# Patient Record
Sex: FEMALE | Race: WHITE | Employment: PART TIME | URBAN - METROPOLITAN AREA
[De-identification: names, ages, dates, MRNs, and addresses within clinical notes are randomized per-mention and may not be internally consistent; named-entity substitution may affect disease eponyms.]

---

## 2019-06-08 ENCOUNTER — ANESTHESIA (OUTPATIENT)
Dept: OPERATING ROOM | Age: 39
DRG: 343 | End: 2019-06-08
Payer: COMMERCIAL

## 2019-06-08 ENCOUNTER — ANESTHESIA EVENT (OUTPATIENT)
Dept: OPERATING ROOM | Age: 39
DRG: 343 | End: 2019-06-08
Payer: COMMERCIAL

## 2019-06-08 ENCOUNTER — APPOINTMENT (OUTPATIENT)
Dept: CT IMAGING | Age: 39
DRG: 343 | End: 2019-06-08
Payer: COMMERCIAL

## 2019-06-08 ENCOUNTER — HOSPITAL ENCOUNTER (INPATIENT)
Age: 39
LOS: 1 days | Discharge: HOME | DRG: 343 | End: 2019-06-09
Attending: EMERGENCY MEDICINE | Admitting: SURGERY
Payer: COMMERCIAL

## 2019-06-08 VITALS — DIASTOLIC BLOOD PRESSURE: 77 MMHG | SYSTOLIC BLOOD PRESSURE: 125 MMHG | OXYGEN SATURATION: 99 % | TEMPERATURE: 97.3 F

## 2019-06-08 DIAGNOSIS — K35.80 ACUTE APPENDICITIS, UNSPECIFIED ACUTE APPENDICITIS TYPE: Primary | ICD-10-CM

## 2019-06-08 DIAGNOSIS — G89.18 POST-OPERATIVE PAIN: ICD-10-CM

## 2019-06-08 LAB
ALBUMIN SERPL-MCNC: 4.3 G/DL (ref 3.5–5.2)
ALP BLD-CCNC: 103 U/L (ref 35–104)
ALT SERPL-CCNC: 41 U/L (ref 0–32)
ANION GAP SERPL CALCULATED.3IONS-SCNC: 11 MMOL/L (ref 7–16)
APTT: 31.8 SEC (ref 24.5–35.1)
AST SERPL-CCNC: 28 U/L (ref 0–31)
BASOPHILS ABSOLUTE: 0.04 E9/L (ref 0–0.2)
BASOPHILS RELATIVE PERCENT: 0.6 % (ref 0–2)
BILIRUB SERPL-MCNC: 0.4 MG/DL (ref 0–1.2)
BUN BLDV-MCNC: 11 MG/DL (ref 6–20)
CALCIUM SERPL-MCNC: 9.6 MG/DL (ref 8.6–10.2)
CHLORIDE BLD-SCNC: 105 MMOL/L (ref 98–107)
CO2: 26 MMOL/L (ref 22–29)
CREAT SERPL-MCNC: 0.8 MG/DL (ref 0.5–1)
EOSINOPHILS ABSOLUTE: 0.17 E9/L (ref 0.05–0.5)
EOSINOPHILS RELATIVE PERCENT: 2.4 % (ref 0–6)
GFR AFRICAN AMERICAN: >60
GFR NON-AFRICAN AMERICAN: >60 ML/MIN/1.73
GLUCOSE BLD-MCNC: 107 MG/DL (ref 74–99)
HCG(URINE) PREGNANCY TEST: NEGATIVE
HCT VFR BLD CALC: 41.8 % (ref 34–48)
HEMOGLOBIN: 13.8 G/DL (ref 11.5–15.5)
IMMATURE GRANULOCYTES #: 0.02 E9/L
IMMATURE GRANULOCYTES %: 0.3 % (ref 0–5)
INR BLD: 1.1
LACTIC ACID: 1.2 MMOL/L (ref 0.5–2.2)
LIPASE: 21 U/L (ref 13–60)
LYMPHOCYTES ABSOLUTE: 1.87 E9/L (ref 1.5–4)
LYMPHOCYTES RELATIVE PERCENT: 26.8 % (ref 20–42)
MCH RBC QN AUTO: 29.1 PG (ref 26–35)
MCHC RBC AUTO-ENTMCNC: 33 % (ref 32–34.5)
MCV RBC AUTO: 88.2 FL (ref 80–99.9)
MONOCYTES ABSOLUTE: 0.72 E9/L (ref 0.1–0.95)
MONOCYTES RELATIVE PERCENT: 10.3 % (ref 2–12)
NEUTROPHILS ABSOLUTE: 4.17 E9/L (ref 1.8–7.3)
NEUTROPHILS RELATIVE PERCENT: 59.6 % (ref 43–80)
PDW BLD-RTO: 12.4 FL (ref 11.5–15)
PLATELET # BLD: 214 E9/L (ref 130–450)
PMV BLD AUTO: 10.9 FL (ref 7–12)
POTASSIUM SERPL-SCNC: 3.8 MMOL/L (ref 3.5–5)
PROTHROMBIN TIME: 12.2 SEC (ref 9.3–12.4)
RBC # BLD: 4.74 E12/L (ref 3.5–5.5)
SODIUM BLD-SCNC: 142 MMOL/L (ref 132–146)
TOTAL PROTEIN: 7 G/DL (ref 6.4–8.3)
WBC # BLD: 7 E9/L (ref 4.5–11.5)

## 2019-06-08 PROCEDURE — 6360000002 HC RX W HCPCS: Performed by: ANESTHESIOLOGY

## 2019-06-08 PROCEDURE — 6360000004 HC RX CONTRAST MEDICATION: Performed by: RADIOLOGY

## 2019-06-08 PROCEDURE — 88304 TISSUE EXAM BY PATHOLOGIST: CPT

## 2019-06-08 PROCEDURE — 3700000001 HC ADD 15 MINUTES (ANESTHESIA): Performed by: SURGERY

## 2019-06-08 PROCEDURE — 7100000001 HC PACU RECOVERY - ADDTL 15 MIN: Performed by: SURGERY

## 2019-06-08 PROCEDURE — 7100000000 HC PACU RECOVERY - FIRST 15 MIN: Performed by: SURGERY

## 2019-06-08 PROCEDURE — 2580000003 HC RX 258: Performed by: NURSE ANESTHETIST, CERTIFIED REGISTERED

## 2019-06-08 PROCEDURE — G0378 HOSPITAL OBSERVATION PER HR: HCPCS

## 2019-06-08 PROCEDURE — 3600000014 HC SURGERY LEVEL 4 ADDTL 15MIN: Performed by: SURGERY

## 2019-06-08 PROCEDURE — 74177 CT ABD & PELVIS W/CONTRAST: CPT

## 2019-06-08 PROCEDURE — 6370000000 HC RX 637 (ALT 250 FOR IP)

## 2019-06-08 PROCEDURE — 2720000010 HC SURG SUPPLY STERILE: Performed by: SURGERY

## 2019-06-08 PROCEDURE — 3600000004 HC SURGERY LEVEL 4 BASE: Performed by: SURGERY

## 2019-06-08 PROCEDURE — 83605 ASSAY OF LACTIC ACID: CPT

## 2019-06-08 PROCEDURE — 6360000002 HC RX W HCPCS: Performed by: NURSE ANESTHETIST, CERTIFIED REGISTERED

## 2019-06-08 PROCEDURE — 80053 COMPREHEN METABOLIC PANEL: CPT

## 2019-06-08 PROCEDURE — 1200000000 HC SEMI PRIVATE

## 2019-06-08 PROCEDURE — 81025 URINE PREGNANCY TEST: CPT

## 2019-06-08 PROCEDURE — 6360000002 HC RX W HCPCS: Performed by: SURGERY

## 2019-06-08 PROCEDURE — 99285 EMERGENCY DEPT VISIT HI MDM: CPT

## 2019-06-08 PROCEDURE — 6370000000 HC RX 637 (ALT 250 FOR IP): Performed by: SURGERY

## 2019-06-08 PROCEDURE — 2500000003 HC RX 250 WO HCPCS: Performed by: PHYSICIAN ASSISTANT

## 2019-06-08 PROCEDURE — 85025 COMPLETE CBC W/AUTO DIFF WBC: CPT

## 2019-06-08 PROCEDURE — 2500000003 HC RX 250 WO HCPCS: Performed by: SURGERY

## 2019-06-08 PROCEDURE — 96375 TX/PRO/DX INJ NEW DRUG ADDON: CPT

## 2019-06-08 PROCEDURE — 96374 THER/PROPH/DIAG INJ IV PUSH: CPT

## 2019-06-08 PROCEDURE — 0DTJ4ZZ RESECTION OF APPENDIX, PERCUTANEOUS ENDOSCOPIC APPROACH: ICD-10-PCS | Performed by: SURGERY

## 2019-06-08 PROCEDURE — 6360000002 HC RX W HCPCS: Performed by: PHYSICIAN ASSISTANT

## 2019-06-08 PROCEDURE — 85730 THROMBOPLASTIN TIME PARTIAL: CPT

## 2019-06-08 PROCEDURE — 3700000000 HC ANESTHESIA ATTENDED CARE: Performed by: SURGERY

## 2019-06-08 PROCEDURE — 36415 COLL VENOUS BLD VENIPUNCTURE: CPT

## 2019-06-08 PROCEDURE — 2709999900 HC NON-CHARGEABLE SUPPLY: Performed by: SURGERY

## 2019-06-08 PROCEDURE — 2500000003 HC RX 250 WO HCPCS: Performed by: NURSE ANESTHETIST, CERTIFIED REGISTERED

## 2019-06-08 PROCEDURE — 85610 PROTHROMBIN TIME: CPT

## 2019-06-08 PROCEDURE — 6360000002 HC RX W HCPCS

## 2019-06-08 PROCEDURE — 83690 ASSAY OF LIPASE: CPT

## 2019-06-08 PROCEDURE — 94761 N-INVAS EAR/PLS OXIMETRY MLT: CPT

## 2019-06-08 PROCEDURE — 2580000003 HC RX 258: Performed by: PHYSICIAN ASSISTANT

## 2019-06-08 RX ORDER — NEOSTIGMINE METHYLSULFATE 1 MG/ML
INJECTION, SOLUTION INTRAVENOUS PRN
Status: DISCONTINUED | OUTPATIENT
Start: 2019-06-08 | End: 2019-06-08 | Stop reason: SDUPTHER

## 2019-06-08 RX ORDER — OXYCODONE HYDROCHLORIDE AND ACETAMINOPHEN 5; 325 MG/1; MG/1
1 TABLET ORAL EVERY 6 HOURS PRN
Qty: 20 TABLET | Refills: 0 | Status: SHIPPED | OUTPATIENT
Start: 2019-06-08 | End: 2019-06-13 | Stop reason: ALTCHOICE

## 2019-06-08 RX ORDER — PROMETHAZINE HYDROCHLORIDE 25 MG/ML
12.5 INJECTION, SOLUTION INTRAMUSCULAR; INTRAVENOUS ONCE
Status: COMPLETED | OUTPATIENT
Start: 2019-06-08 | End: 2019-06-08

## 2019-06-08 RX ORDER — ONDANSETRON 2 MG/ML
4 INJECTION INTRAMUSCULAR; INTRAVENOUS ONCE
Status: COMPLETED | OUTPATIENT
Start: 2019-06-08 | End: 2019-06-08

## 2019-06-08 RX ORDER — OXYCODONE HYDROCHLORIDE AND ACETAMINOPHEN 5; 325 MG/1; MG/1
2 TABLET ORAL EVERY 4 HOURS PRN
Status: DISCONTINUED | OUTPATIENT
Start: 2019-06-08 | End: 2019-06-09 | Stop reason: HOSPADM

## 2019-06-08 RX ORDER — FENTANYL CITRATE 50 UG/ML
INJECTION, SOLUTION INTRAMUSCULAR; INTRAVENOUS PRN
Status: DISCONTINUED | OUTPATIENT
Start: 2019-06-08 | End: 2019-06-08 | Stop reason: SDUPTHER

## 2019-06-08 RX ORDER — 0.9 % SODIUM CHLORIDE 0.9 %
VIAL (ML) INJECTION
Status: DISPENSED
Start: 2019-06-08 | End: 2019-06-09

## 2019-06-08 RX ORDER — LIDOCAINE HYDROCHLORIDE 20 MG/ML
INJECTION, SOLUTION EPIDURAL; INFILTRATION; INTRACAUDAL; PERINEURAL PRN
Status: DISCONTINUED | OUTPATIENT
Start: 2019-06-08 | End: 2019-06-08 | Stop reason: SDUPTHER

## 2019-06-08 RX ORDER — PROPOFOL 10 MG/ML
INJECTION, EMULSION INTRAVENOUS PRN
Status: DISCONTINUED | OUTPATIENT
Start: 2019-06-08 | End: 2019-06-08 | Stop reason: SDUPTHER

## 2019-06-08 RX ORDER — DEXAMETHASONE SODIUM PHOSPHATE 4 MG/ML
INJECTION, SOLUTION INTRA-ARTICULAR; INTRALESIONAL; INTRAMUSCULAR; INTRAVENOUS; SOFT TISSUE PRN
Status: DISCONTINUED | OUTPATIENT
Start: 2019-06-08 | End: 2019-06-08 | Stop reason: SDUPTHER

## 2019-06-08 RX ORDER — KETOROLAC TROMETHAMINE 30 MG/ML
15 INJECTION, SOLUTION INTRAMUSCULAR; INTRAVENOUS ONCE
Status: COMPLETED | OUTPATIENT
Start: 2019-06-08 | End: 2019-06-08

## 2019-06-08 RX ORDER — ONDANSETRON 2 MG/ML
INJECTION INTRAMUSCULAR; INTRAVENOUS PRN
Status: DISCONTINUED | OUTPATIENT
Start: 2019-06-08 | End: 2019-06-08 | Stop reason: SDUPTHER

## 2019-06-08 RX ORDER — SODIUM CHLORIDE, SODIUM LACTATE, POTASSIUM CHLORIDE, CALCIUM CHLORIDE 600; 310; 30; 20 MG/100ML; MG/100ML; MG/100ML; MG/100ML
INJECTION, SOLUTION INTRAVENOUS CONTINUOUS PRN
Status: DISCONTINUED | OUTPATIENT
Start: 2019-06-08 | End: 2019-06-08 | Stop reason: SDUPTHER

## 2019-06-08 RX ORDER — OXYCODONE HYDROCHLORIDE AND ACETAMINOPHEN 5; 325 MG/1; MG/1
1 TABLET ORAL EVERY 4 HOURS PRN
Status: DISCONTINUED | OUTPATIENT
Start: 2019-06-08 | End: 2019-06-09 | Stop reason: HOSPADM

## 2019-06-08 RX ORDER — MIDAZOLAM HYDROCHLORIDE 1 MG/ML
INJECTION INTRAMUSCULAR; INTRAVENOUS PRN
Status: DISCONTINUED | OUTPATIENT
Start: 2019-06-08 | End: 2019-06-08 | Stop reason: SDUPTHER

## 2019-06-08 RX ORDER — GLYCOPYRROLATE 1 MG/5 ML
SYRINGE (ML) INTRAVENOUS PRN
Status: DISCONTINUED | OUTPATIENT
Start: 2019-06-08 | End: 2019-06-08 | Stop reason: SDUPTHER

## 2019-06-08 RX ORDER — PROMETHAZINE HYDROCHLORIDE 25 MG/ML
6.25 INJECTION, SOLUTION INTRAMUSCULAR; INTRAVENOUS
Status: COMPLETED | OUTPATIENT
Start: 2019-06-08 | End: 2019-06-08

## 2019-06-08 RX ORDER — ROCURONIUM BROMIDE 10 MG/ML
INJECTION, SOLUTION INTRAVENOUS PRN
Status: DISCONTINUED | OUTPATIENT
Start: 2019-06-08 | End: 2019-06-08 | Stop reason: SDUPTHER

## 2019-06-08 RX ORDER — ONDANSETRON 2 MG/ML
4 INJECTION INTRAMUSCULAR; INTRAVENOUS EVERY 8 HOURS PRN
Status: DISCONTINUED | OUTPATIENT
Start: 2019-06-08 | End: 2019-06-09 | Stop reason: HOSPADM

## 2019-06-08 RX ORDER — CETIRIZINE HYDROCHLORIDE 10 MG/1
10 TABLET ORAL DAILY
COMMUNITY
End: 2022-07-06

## 2019-06-08 RX ORDER — SCOLOPAMINE TRANSDERMAL SYSTEM 1 MG/1
1 PATCH, EXTENDED RELEASE TRANSDERMAL ONCE
Status: COMPLETED | OUTPATIENT
Start: 2019-06-08 | End: 2019-06-08

## 2019-06-08 RX ORDER — LIDOCAINE HYDROCHLORIDE AND EPINEPHRINE 10; 10 MG/ML; UG/ML
INJECTION, SOLUTION INFILTRATION; PERINEURAL PRN
Status: DISCONTINUED | OUTPATIENT
Start: 2019-06-08 | End: 2019-06-08 | Stop reason: ALTCHOICE

## 2019-06-08 RX ORDER — SCOLOPAMINE TRANSDERMAL SYSTEM 1 MG/1
PATCH, EXTENDED RELEASE TRANSDERMAL
Status: COMPLETED
Start: 2019-06-08 | End: 2019-06-08

## 2019-06-08 RX ORDER — OXYCODONE HYDROCHLORIDE AND ACETAMINOPHEN 5; 325 MG/1; MG/1
1 TABLET ORAL EVERY 4 HOURS PRN
Status: DISCONTINUED | OUTPATIENT
Start: 2019-06-08 | End: 2019-06-08

## 2019-06-08 RX ORDER — 0.9 % SODIUM CHLORIDE 0.9 %
1000 INTRAVENOUS SOLUTION INTRAVENOUS ONCE
Status: COMPLETED | OUTPATIENT
Start: 2019-06-08 | End: 2019-06-08

## 2019-06-08 RX ADMIN — METRONIDAZOLE 500 MG: 500 INJECTION, SOLUTION INTRAVENOUS at 11:48

## 2019-06-08 RX ADMIN — ONDANSETRON 4 MG: 2 INJECTION INTRAMUSCULAR; INTRAVENOUS at 08:54

## 2019-06-08 RX ADMIN — CEFTRIAXONE 1 G: 1 INJECTION, POWDER, FOR SOLUTION INTRAMUSCULAR; INTRAVENOUS at 11:17

## 2019-06-08 RX ADMIN — HYDROMORPHONE HYDROCHLORIDE 0.5 MG: 1 INJECTION, SOLUTION INTRAMUSCULAR; INTRAVENOUS; SUBCUTANEOUS at 16:15

## 2019-06-08 RX ADMIN — ONDANSETRON 4 MG: 2 INJECTION INTRAMUSCULAR; INTRAVENOUS at 20:34

## 2019-06-08 RX ADMIN — OXYCODONE HYDROCHLORIDE AND ACETAMINOPHEN 2 TABLET: 5; 325 TABLET ORAL at 22:42

## 2019-06-08 RX ADMIN — SODIUM CHLORIDE 1000 ML: 9 INJECTION, SOLUTION INTRAVENOUS at 08:54

## 2019-06-08 RX ADMIN — PROMETHAZINE HYDROCHLORIDE 6.25 MG: 25 INJECTION INTRAMUSCULAR; INTRAVENOUS at 15:57

## 2019-06-08 RX ADMIN — DEXAMETHASONE SODIUM PHOSPHATE 8 MG: 4 INJECTION, SOLUTION INTRAMUSCULAR; INTRAVENOUS at 14:45

## 2019-06-08 RX ADMIN — MIDAZOLAM HYDROCHLORIDE 2 MG: 1 INJECTION, SOLUTION INTRAMUSCULAR; INTRAVENOUS at 14:33

## 2019-06-08 RX ADMIN — HYDROMORPHONE HYDROCHLORIDE 0.5 MG: 1 INJECTION, SOLUTION INTRAMUSCULAR; INTRAVENOUS; SUBCUTANEOUS at 16:29

## 2019-06-08 RX ADMIN — LIDOCAINE HYDROCHLORIDE 100 MG: 20 INJECTION, SOLUTION EPIDURAL; INFILTRATION; INTRACAUDAL; PERINEURAL at 14:37

## 2019-06-08 RX ADMIN — FENTANYL CITRATE 50 MCG: 50 INJECTION, SOLUTION INTRAMUSCULAR; INTRAVENOUS at 14:51

## 2019-06-08 RX ADMIN — PROMETHAZINE HYDROCHLORIDE 12.5 MG: 25 INJECTION INTRAMUSCULAR; INTRAVENOUS at 11:39

## 2019-06-08 RX ADMIN — ONDANSETRON HYDROCHLORIDE 4 MG: 2 INJECTION, SOLUTION INTRAMUSCULAR; INTRAVENOUS at 14:45

## 2019-06-08 RX ADMIN — IOPAMIDOL 110 ML: 755 INJECTION, SOLUTION INTRAVENOUS at 09:47

## 2019-06-08 RX ADMIN — HYDROMORPHONE HYDROCHLORIDE 0.5 MG: 1 INJECTION, SOLUTION INTRAMUSCULAR; INTRAVENOUS; SUBCUTANEOUS at 20:19

## 2019-06-08 RX ADMIN — SODIUM CHLORIDE, POTASSIUM CHLORIDE, SODIUM LACTATE AND CALCIUM CHLORIDE: 600; 310; 30; 20 INJECTION, SOLUTION INTRAVENOUS at 14:33

## 2019-06-08 RX ADMIN — Medication 0.4 MG: at 15:27

## 2019-06-08 RX ADMIN — PROPOFOL 200 MG: 10 INJECTION, EMULSION INTRAVENOUS at 14:37

## 2019-06-08 RX ADMIN — Medication 3 MG: at 15:27

## 2019-06-08 RX ADMIN — KETOROLAC TROMETHAMINE 15 MG: 30 INJECTION, SOLUTION INTRAMUSCULAR; INTRAVENOUS at 08:53

## 2019-06-08 RX ADMIN — OXYCODONE HYDROCHLORIDE AND ACETAMINOPHEN 1 TABLET: 5; 325 TABLET ORAL at 18:41

## 2019-06-08 RX ADMIN — FENTANYL CITRATE 100 MCG: 50 INJECTION, SOLUTION INTRAMUSCULAR; INTRAVENOUS at 14:37

## 2019-06-08 RX ADMIN — ROCURONIUM BROMIDE 40 MG: 10 SOLUTION INTRAVENOUS at 14:37

## 2019-06-08 RX ADMIN — ENOXAPARIN SODIUM 40 MG: 40 INJECTION SUBCUTANEOUS at 14:27

## 2019-06-08 RX ADMIN — FENTANYL CITRATE 100 MCG: 50 INJECTION, SOLUTION INTRAMUSCULAR; INTRAVENOUS at 15:40

## 2019-06-08 ASSESSMENT — PULMONARY FUNCTION TESTS
PIF_VALUE: 24
PIF_VALUE: 25
PIF_VALUE: 24
PIF_VALUE: 19
PIF_VALUE: 17
PIF_VALUE: 24
PIF_VALUE: 26
PIF_VALUE: 13
PIF_VALUE: 15
PIF_VALUE: 19
PIF_VALUE: 15
PIF_VALUE: 15
PIF_VALUE: 25
PIF_VALUE: 27
PIF_VALUE: 2
PIF_VALUE: 23
PIF_VALUE: 24
PIF_VALUE: 1
PIF_VALUE: 9
PIF_VALUE: 27
PIF_VALUE: 2
PIF_VALUE: 20
PIF_VALUE: 2
PIF_VALUE: 1
PIF_VALUE: 25
PIF_VALUE: 15
PIF_VALUE: 19
PIF_VALUE: 2
PIF_VALUE: 16
PIF_VALUE: 22
PIF_VALUE: 27
PIF_VALUE: 27
PIF_VALUE: 15
PIF_VALUE: 24
PIF_VALUE: 19
PIF_VALUE: 25
PIF_VALUE: 1
PIF_VALUE: 15
PIF_VALUE: 26
PIF_VALUE: 24
PIF_VALUE: 19
PIF_VALUE: 27
PIF_VALUE: 2
PIF_VALUE: 2
PIF_VALUE: 0
PIF_VALUE: 2
PIF_VALUE: 15
PIF_VALUE: 20
PIF_VALUE: 24
PIF_VALUE: 23
PIF_VALUE: 3
PIF_VALUE: 24
PIF_VALUE: 27
PIF_VALUE: 21
PIF_VALUE: 13
PIF_VALUE: 23
PIF_VALUE: 21
PIF_VALUE: 24
PIF_VALUE: 15
PIF_VALUE: 17
PIF_VALUE: 15
PIF_VALUE: 1
PIF_VALUE: 26
PIF_VALUE: 0
PIF_VALUE: 25
PIF_VALUE: 27
PIF_VALUE: 27
PIF_VALUE: 19
PIF_VALUE: 1

## 2019-06-08 ASSESSMENT — PAIN DESCRIPTION - DESCRIPTORS
DESCRIPTORS: ACHING;DISCOMFORT;DULL
DESCRIPTORS: ACHING;CONSTANT;DISCOMFORT
DESCRIPTORS: ACHING;DISCOMFORT;DULL

## 2019-06-08 ASSESSMENT — PAIN SCALES - GENERAL
PAINLEVEL_OUTOF10: 5
PAINLEVEL_OUTOF10: 6
PAINLEVEL_OUTOF10: 8
PAINLEVEL_OUTOF10: 0
PAINLEVEL_OUTOF10: 7
PAINLEVEL_OUTOF10: 7
PAINLEVEL_OUTOF10: 5
PAINLEVEL_OUTOF10: 5
PAINLEVEL_OUTOF10: 7
PAINLEVEL_OUTOF10: 0
PAINLEVEL_OUTOF10: 7

## 2019-06-08 ASSESSMENT — PAIN DESCRIPTION - ONSET
ONSET: ON-GOING

## 2019-06-08 ASSESSMENT — PAIN DESCRIPTION - ORIENTATION
ORIENTATION: RIGHT;MID
ORIENTATION: RIGHT;MID
ORIENTATION: RIGHT;LOWER
ORIENTATION: RIGHT;LEFT

## 2019-06-08 ASSESSMENT — PAIN DESCRIPTION - PROGRESSION
CLINICAL_PROGRESSION: GRADUALLY WORSENING

## 2019-06-08 ASSESSMENT — PAIN DESCRIPTION - LOCATION
LOCATION: ABDOMEN

## 2019-06-08 ASSESSMENT — PAIN - FUNCTIONAL ASSESSMENT
PAIN_FUNCTIONAL_ASSESSMENT: ACTIVITIES ARE NOT PREVENTED

## 2019-06-08 ASSESSMENT — PAIN DESCRIPTION - PAIN TYPE
TYPE: ACUTE PAIN
TYPE: SURGICAL PAIN
TYPE: SURGICAL PAIN

## 2019-06-08 ASSESSMENT — PAIN DESCRIPTION - FREQUENCY
FREQUENCY: CONTINUOUS

## 2019-06-08 NOTE — ED PROVIDER NOTES
ED Attending  CC: Anuja      Department of Emergency Medicine   ED  Provider Note  Admit Date/RoomTime: 6/8/2019  8:12 AM  ED Room: 22/22  MRN: [id number]  Chief Complaint       Abdominal Pain (pain around umbilicus yesterday now pain RLQ of abdomen. Hysterectomy 3/28)    History of Present Illness   Source of history provided by:  patient. History/Exam Limitations: none. Jermaine Coleman is a 41 y.o. old female who has a past medical history of:   Past Medical History:   Diagnosis Date    Asthma     Uses albuterol when necessary.  Clotting disorder ([facility] [address]     NEISHA-1    Endometriosis     Gum disease 2011    MTHFR mutation ([facility] [address]     1298    Osteopenia     Pernicious anemia     Premature ovarian failure     Raynaud's disease     Skin mole     precancerous mole removed in 1/2011    Tachycardia 9/2014    holter monitor done , no ectopy, hormone induceed ?    presents to the emergency department by private vehicle, for complaints of gradual onset, worse since this morning aching, sharp pain in the periumbilical area and in the RLQ . States that the pain began in her umbilicus last night and was vague when it started. She woke up early this morning and still has the discomfort in her umbilicus but it has traveled to her RLQ. She had a hysterectomy 10 weeks ago and has had some mild discomfort since the procedure but this is more severe than her typical. Denies fevers. Had diarrhea x3 3 days ago. Is nauseated with no vomiting. States she has been having urinary frequency for the past few weeks but had a negative urinalysis at her obgyn. Sitting up makes the pain worse. ROS   Pertinent positives and negatives are stated within HPI, all other systems reviewed and are negative.     Past Surgical History:   Procedure Laterality Date    COLONOSCOPY  2000    Chronic constipation    CYST REMOVAL  5/2000    Right ankle    OTHER SURGICAL HISTORY  3/26/15 excision lesion lt upper arm    SKIN BIOPSY      UPPER GASTROINTESTINAL ENDOSCOPY  2000   Social History:  reports that she quit smoking about 18 years ago. Her smoking use included cigarettes. She has never used smokeless tobacco. She reports that she does not drink alcohol or use drugs. Family History: family history includes Colon Cancer in her paternal grandfather and paternal grandmother; Diabetes in her maternal grandfather; Emphysema in her maternal grandfather; Heart Disease in her paternal grandmother; High Cholesterol in her mother; Ulcerative Colitis in her father. Allergies: Hydroxyprogesterone; Adhesive tape; Clindamycin/lincomycin; Codeine; and Prednisone    Physical Exam           ED Triage Vitals [06/08/19 0810]   BP Temp Temp Source Pulse Resp SpO2 Height Weight   (!) 123/57 98.2 °F (36.8 °C) Oral 112 14 98 % 5' 8\" (1.727 m) 170 lb (77.1 kg)      Oxygen Saturation Interpretation: Normal.    · General Appearance/Constitutional:  Alert, development consistent with age. · HEENT:  NC/NT. PERRLA. Airway patent. · Neck:  Supple. No lymphadenopathy. · Respiratory:  No retractions. Lungs Clear to auscultation and breath sounds equal.  · CV:  Regular rate and rhythm. · GI:  General Appearance: normal. Well healing laparoscopic surgical scars         Bowel sounds: normal bowel sounds. Distension:  None. Tenderness: moderate in the RLQ at McBurney's point. Positive rovsing's         Liver: non-tender. Spleen:  non-tender. Pulsatile Mass: absent. · Back: CVA Tenderness: No.  · : (chaperone present during examination). Not indicated/deferred  · Integument:  Normal turgor. Warm, dry, without visible rash, unless noted elsewhere. · Lymphatics: No edema, cap.refill <3sec. · Neurological:  Orientation age-appropriate. Motor functions intact.     Lab / Imaging Results   (All laboratory and radiology results have been personally reviewed by myself)  Labs:  Results for orders placed or performed Making     Medications   cefTRIAXone (ROCEPHIN) 1 g in dextrose 5 % 50 mL IVPB (vial-mate) (1 g Intravenous New Bag 6/8/19 1117)   metronidazole (FLAGYL) 500 mg in NaCl 100 mL IVPB premix (has no administration in time range)   promethazine (PHENERGAN) injection 12.5 mg (has no administration in time range)   0.9 % sodium chloride bolus (0 mLs Intravenous Stopped 6/8/19 1054)   ketorolac (TORADOL) injection 15 mg (15 mg Intravenous Given 6/8/19 0853)   ondansetron (ZOFRAN) injection 4 mg (4 mg Intravenous Given 6/8/19 0854)   iopamidol (ISOVUE-370) 76 % injection 110 mL (110 mLs Intravenous Given 6/8/19 0947)        Re-examination:  6/8/19       Time: 0915   Patients symptoms are improving. States pain and nausea are well controlled    Consults:  I spoke with Dr Patty Mac at [phone] who agreed to admission    Procedures:   none    MDM:   Pt presenting for periumbilical and RLQ pain that worsened this morning. No elevated WBC count, normal lactic. Appendicitis noted on CT. Vitals stable and pain controlled throughout her stay in the ED. IV ABX given. Dr Patty Mac, general surgery, consulted and he will admit patient for appendicitis. Counseling: The emergency provider has spoken with the patient and discussed todays results, in addition to providing specific details for the plan of care and counseling regarding the diagnosis and prognosis. Questions are answered at this time and they are agreeable with the plan . Assessment      1. Acute appendicitis, unspecified acute appendicitis type      Plan   Admit to med/surg  Patient condition is stable    New Medications     New Prescriptions    No medications on file     Electronically signed by Quentin Watt PA-C   DD: 6/8/19  **This report was transcribed using voice recognition software. Every effort was made to ensure accuracy; however, inadvertent computerized transcription errors may be present.   END OF ED PROVIDER NOTE        Quiana Lobo PA-C  06/08/19 1 Donald Bell

## 2019-06-08 NOTE — H&P
General Surgery Consult    Patient's Name/Date of Birth: Mamie Sadler / 1980    Date: June 8, 2019     Consulting Surgeon: Song Song M.D.    PCP: Vero Russo MD     Chief Complaint: RLQ pain    HPI:   Mamie Sadler is a 41 y.o. female who presents for evaluation of RLQ pain. Patient states that it is  a  sharp pain in the periumbilical area has migrated to the RLQ. Movement makes the pain worse, no alliviating factors. She states that the pain began at her umbilicus last night and was vague when it started. She had a hysterectomy 10 weeks ago and has had some mild discomfort since the procedure but this is more severe than her typical. Denies fevers, chills, chest pain, SOB and dysuria. Patient had diarrhea x3 3 days ago and nausea today but no vomiting. Past Medical History:   Diagnosis Date    Asthma     Uses albuterol when necessary.  Clotting disorder ([facility] [address]     NEISHA-1    Endometriosis     Gum disease 2011    MTHFR mutation ([facility] [address]     1298    Osteopenia     Pernicious anemia     Premature ovarian failure     Raynaud's disease     Skin mole     precancerous mole removed in 1/2011    Tachycardia 9/2014    holter monitor done , no ectopy, hormone induceed ?        Past Surgical History:   Procedure Laterality Date    COLONOSCOPY  2000    Chronic constipation    CYST REMOVAL  5/2000    Right ankle    OTHER SURGICAL HISTORY  3/26/15 excision lesion lt upper arm    SKIN BIOPSY      UPPER GASTROINTESTINAL ENDOSCOPY  2000       Current Facility-Administered Medications   Medication Dose Route Frequency Provider Last Rate Last Dose    scopolamine (TRANSDERM-SCOP) transdermal patch 1 patch  1 patch Transdermal Once Sedrick Low MD        HYDROmorphone (DILAUDID) injection 0.25 mg  0.25 mg Intravenous Q5 Min PRN Sedrick Low MD        HYDROmorphone (DILAUDID) injection 0.5 mg  0.5 mg Intravenous Q5 Min PRN Sedrick Low MD        promethazine (PHENERGAN) injection 6.25 mg  6.25 mg Latest Ref Range: 4.5 - 11.5 E9/L 7.0     RBC Latest Ref Range: 3.50 - 5.50 E12/L 4.74     Hemoglobin Quant Latest Ref Range: 11.5 - 15.5 g/dL 13.8     Hematocrit Latest Ref Range: 34.0 - 48.0 % 41.8     MCV Latest Ref Range: 80.0 - 99.9 fL 88.2     MCH Latest Ref Range: 26.0 - 35.0 pg 29.1     MCHC Latest Ref Range: 32.0 - 34.5 % 33.0     MPV Latest Ref Range: 7.0 - 12.0 fL 10.9     RDW Latest Ref Range: 11.5 - 15.0 fL 12.4     Platelet Count Latest Ref Range: 130 - 450 E9/L 214     Neutrophils % Latest Ref Range: 43.0 - 80.0 % 59.6     Immature Granulocytes % Latest Ref Range: 0.0 - 5.0 % 0.3     Lymphocyte % Latest Ref Range: 20.0 - 42.0 % 26.8     Monocytes % Latest Ref Range: 2.0 - 12.0 % 10.3     Eosinophils % Latest Ref Range: 0.0 - 6.0 % 2.4     Basophils % Latest Ref Range: 0.0 - 2.0 % 0.6     Neutrophils # Latest Ref Range: 1.80 - 7.30 E9/L 4.17     Immature Granulocytes # Latest Units: E9/L 0.02     Lymphocytes # Latest Ref Range: 1.50 - 4.00 E9/L 1.87     Monocytes # Latest Ref Range: 0.10 - 0.95 E9/L 0.72     Eosinophils # Latest Ref Range: 0.05 - 0.50 E9/L 0.17     Basophils # Latest Ref Range: 0.00 - 0.20 E9/L 0.04     Prothrombin Time Latest Ref Range: 9.3 - 12.4 sec  12.2    INR Unknown  1.1    aPTT Latest Ref Range: 24.5 - 35.1 sec  31.8    HCG(Urine) Pregnancy Test Latest Ref Range: NEGATIVE    NEGATIVE       Radiology:  CT abdomen/pelvis:   Impression   Acute appendicitis with thickened inflamed appendix without   complications. Assessment:  41 y.o. female with acute appendicitis    Plan:  Recommend appendectomy. Risks, benefits, alteratives (and risks of alternatives) of the procedure were discussed with patient at bedside, all questions answered. She understands and agrees to proceed.       Freya Steinberg MD  6/8/2019  2:32 PM

## 2019-06-08 NOTE — PROGRESS NOTES
Nursing Transfer Note    Data:  Summary of patients progress: general anesthesia recovery    Reason for transfer: PACU discharge criteria met, transferred to next level of care. Action:  Explained reason for transfer to Patient/Family  Report given to: rn, using RN Handoff Navigator.   Mode of transportation: Cart    Response:  RN Recommendations:continaution of care and pain control

## 2019-06-08 NOTE — OP NOTE
Operative Note    Preop Dx: Acute appendicitis    Postop Dx: same    Procedure: laparoscopic appendectomy    Surgeon: Cristina Zavala MD    EBL: 5 mL    IVF: 800 mL    UOP: 976 mL    Complications: None    Disposition: PACU, stable    Indications:    70-year-old female with history, physical, laboratory and imaging findings consistent with acute appendicitis. Appendectomy was recommended. Risks, benefits, alternatives (and risks of alternatives) of surgery were discussed with the patient, which include but are not limited to, bleeding, infection, conversion to open, bowel injury, ureter injury, further procedures, hernia formation, risk of anesthesia, blood clots, pneumonia, heart attack and stroke. Patient understands these risk and agrees to proceed. Details of Procedure:      Patient was taken to the operating room and placed in the supine position. General anesthesia was induced. Paul catheter was inserted. Abdomen was prepped and draped in the sterile fashion. Varies needle was inserted at Acosta's point and after confirmation with a drop test abdomen was insufflated to 15 mmHg of CO2 gas. Next 1% Lidocaine with epinephrine was instilled at the supraumbilical skin. A 12 mm incision was made and fascia was elevated using a kocher clamp. 12 mm trocar was placed using the OB10 lense. Laparoscope was reinserted, there was no injuries identfied after intial trocar or Veress needle placement. The following trocars were placed under direct vision: 5 mm suprapubic trocar and 12 mm LLQ trocar. Patient was placed in Trendelenburg and right side up position. The appendix was visualized and was found to be acutely inflamed. The tip and body of the appendix was bluntly dissected off the retroperitoneum. After doing so, I was able to identify a grossly normal base of the appendix traveling into the cecum. Terminal ileum was identified and was kept out of harms way.  Next a [place] grasper was used to create a

## 2019-06-08 NOTE — ED NOTES
Radiology Procedure Waiver   Name: Emilia Montenegro  : 1980  MRN: [id number]    Date:  19    Time: 9:40 AM    Benefits of immediately proceeding with Radiology exam(s) without pre-testing outweigh the risks or are not indicated as specified below and therefore the following is/are being waived:    [x] Pregnancy test   [] Patients LMP on-time and regular.   [] Patient had Tubal Ligation or has other Contraception Device. [] Patient  is Menopausal or Premenarcheal.    [x] Patient had Full or Partial Hysterectomy. [] Protocol for Iodine allergy    [] MRI Questionnaire     [] BUN/Creatinine   [] Patient age w/no hx of renal dysfunction. [] Patient on Dialysis. [] Recent Normal Labs.   Electronically signed by Quentin Watt PA-C on 19 at 9:40 AM               Quiana Lobo PA-C  [phone]

## 2019-06-08 NOTE — ANESTHESIA POSTPROCEDURE EVALUATION
Department of Anesthesiology  Postprocedure Note    Patient: Alvaro Lopez  MRN: [id number]  YOB: 1980  Date of evaluation: 6/8/2019  Time:  6:53 PM     Procedure Summary     Date:  06/08/19 Room / Location:  [facility] OR 02 / [facility] OR    Anesthesia Start:  6154 Anesthesia Stop:  8075    Procedure:  APPENDECTOMY LAPAROSCOPIC (N/A Abdomen) Diagnosis:  (ACUTE APPENDICITIS)    Surgeon:  Ronan Villarreal MD Responsible Provider:  Burak Park MD    Anesthesia Type:  general ASA Status:  2 - Emergent          Anesthesia Type: general    Black Phase I: Black Score: 10    Black Phase II:      Last vitals: Reviewed and per EMR flowsheets.        Anesthesia Post Evaluation    Patient location during evaluation: PACU  Patient participation: complete - patient participated  Level of consciousness: awake and alert  Pain score: 2  Airway patency: patent  Nausea & Vomiting: no vomiting and no nausea  Complications: no  Cardiovascular status: hemodynamically stable  Respiratory status: spontaneous ventilation  Hydration status: stable

## 2019-06-08 NOTE — ANESTHESIA PRE PROCEDURE
Department of Anesthesiology  Preprocedure Note       Name:  Parminder Morales   Age:  41 y.o.  :  1980                                          MRN:  [id number]         Date:  2019      Surgeon: Fern Oscar):  Rica Abdi MD    Procedure: APPENDECTOMY LAPAROSCOPIC (N/A Abdomen)    Medications prior to admission:   Prior to Admission medications    Medication Sig Start Date End Date Taking? Authorizing Provider   cetirizine (ZYRTEC) 10 MG tablet Take 10 mg by mouth daily   Yes Historical Provider, MD   buPROPion (WELLBUTRIN) 100 MG tablet Take 100 mg by mouth daily. Yes Historical Provider, MD   desvenlafaxine (PRISTIQ) 50 MG TB24 Take 50 mg by mouth daily. Yes Historical Provider, MD   promethazine (PHENERGAN) 12.5 MG tablet Take 25 mg by mouth as needed. Yes Marlen Thacker RN   Cholecalciferol (VITAMIN D3) 3000 UNITS TABS Take 2,000 Int'l Units by mouth daily. Historical Provider, MD   LORazepam (ATIVAN) 0.5 MG tablet Take 0.5 mg by mouth every 6 hours as needed. 14   Historical Provider, MD   norethindrone (AYGESTIN) 5 MG tablet Take 1.5 mg by mouth daily. Historical Provider, MD   cyanocobalamin 1000 MCG/ML injection Inject 1,000 mcg into the muscle every 30 days. Historical Provider, MD       Current medications:    Current Facility-Administered Medications   Medication Dose Route Frequency Provider Last Rate Last Dose    HYDROmorphone (DILAUDID) injection 0.25 mg  0.25 mg Intravenous Q5 Min PRN Claudia Sandy MD        HYDROmorphone (DILAUDID) injection 0.5 mg  0.5 mg Intravenous Q5 Min PRN Claudia Sandy MD        promethazine (PHENERGAN) injection 6.25 mg  6.25 mg Intravenous Once PRN Claudia Sandy MD           Allergies:     Allergies   Allergen Reactions    Hydroxyprogesterone Shortness Of Breath    Adhesive Tape      blisters and rash    Clindamycin/Lincomycin Hives    Codeine Nausea And Vomiting    Prednisone Nausea And Vomiting     With oral only         Problem List: Patient Active Problem List   Diagnosis Code    Pregnancy with history of pre-term labor O09.219    Other current maternal conditions classifiable elsewhere, antepartum O99.89    Placenta previa without hemorrhage, antepartum O44.00    High-risk pregnancy O09.90    Asthma J45.909    Other procreative management counseling and advice Z31.69    Personal history of other genital system and obstetric disorders(V13.29) Z87.42, Z87.59    Primary hypercoagulable state ([facility] [address] D68.59    Acute appendicitis K35.80       Past Medical History:        Diagnosis Date    Asthma     Uses albuterol when necessary.  Clotting disorder ([facility] [address]     NEISHA-1    Endometriosis     Gum disease     [facility]     1298    Osteopenia     Pernicious anemia     Premature ovarian failure     Raynaud's disease     Skin mole     precancerous mole removed in 2011    Tachycardia 2014    holter monitor done , no ectopy, hormone induceed ? Past Surgical History:        Procedure Laterality Date    COLONOSCOPY      Chronic constipation    CYST REMOVAL  2000    Right ankle    OTHER SURGICAL HISTORY  3/26/15 excision lesion lt upper arm    SKIN BIOPSY      UPPER GASTROINTESTINAL ENDOSCOPY         Social History:    Social History     Tobacco Use    Smoking status: Former Smoker     Types: Cigarettes     Last attempt to quit: 2000     Years since quittin.5    Smokeless tobacco: Never Used   Substance Use Topics    Alcohol use:  No                                Counseling given: Not Answered      Vital Signs (Current):   Vitals:    19 0810 19 1129 19 1226 19 1415   BP: (!) 123/57 111/61 119/68 109/68   Pulse: 112 103 100 107   Resp: 14 16 16 16   Temp: 98.2 °F (36.8 °C) 98.9 °F (37.2 °C) 98.5 °F (36.9 °C)    TempSrc: Oral Oral Oral    SpO2: 98%  99% 100%   Weight: 170 lb (77.1 kg)      Height: 5' 8\" (1.727 m)                                                 BP Readings from Last 3 Encounters:   06/08/19 109/68   05/14/16 111/72   12/05/15 100/64       NPO Status: Time of last liquid consumption: 2100                        Time of last solid consumption: 2300                        Date of last liquid consumption: 06/07/19                        Date of last solid food consumption: 06/07/19    BMI:   Wt Readings from Last 3 Encounters:   06/08/19 170 lb (77.1 kg)   05/14/16 148 lb (67.1 kg)   12/05/15 140 lb (63.5 kg)     Body mass index is 25.85 kg/m². CBC:   Lab Results   Component Value Date    WBC 7.0 06/08/2019    RBC 4.74 06/08/2019    HGB 13.8 06/08/2019    HCT 41.8 06/08/2019    MCV 88.2 06/08/2019    RDW 12.4 06/08/2019     06/08/2019       CMP:   Lab Results   Component Value Date     06/08/2019    K 3.8 06/08/2019     06/08/2019    CO2 26 06/08/2019    BUN 11 06/08/2019    CREATININE 0.8 06/08/2019    GFRAA >60 06/08/2019    LABGLOM >60 06/08/2019    GLUCOSE 107 06/08/2019    PROT 7.0 06/08/2019    CALCIUM 9.6 06/08/2019    BILITOT 0.4 06/08/2019    ALKPHOS 103 06/08/2019    AST 28 06/08/2019    ALT 41 06/08/2019       POC Tests: No results for input(s): POCGLU, POCNA, POCK, POCCL, POCBUN, POCHEMO, POCHCT in the last 72 hours. Coags:   Lab Results   Component Value Date    PROTIME 12.2 06/08/2019    INR 1.1 06/08/2019    APTT 31.8 06/08/2019       HCG (If Applicable):   Lab Results   Component Value Date    PREGTESTUR NEGATIVE 06/08/2019        ABGs: No results found for: PHART, PO2ART, QVC7CPQ, HGU7HMU, BEART, K1PZQWMC     Type & Screen (If Applicable):  [facility] results found for: [facility]    Anesthesia Evaluation  Patient summary reviewed and Nursing notes reviewed   history of anesthetic complications: PONV.   Airway: Mallampati: II  TM distance: >3 FB   Neck ROM: full  Mouth opening: > = 3 FB Dental: normal exam         Pulmonary: breath sounds clear to auscultation  (+) asthma:                            Cardiovascular:  Exercise

## 2019-06-09 VITALS
RESPIRATION RATE: 16 BRPM | SYSTOLIC BLOOD PRESSURE: 96 MMHG | BODY MASS INDEX: 25.76 KG/M2 | TEMPERATURE: 97.7 F | HEIGHT: 68 IN | DIASTOLIC BLOOD PRESSURE: 61 MMHG | HEART RATE: 95 BPM | WEIGHT: 170 LBS | OXYGEN SATURATION: 97 %

## 2019-06-09 PROCEDURE — 6360000002 HC RX W HCPCS: Performed by: SURGERY

## 2019-06-09 PROCEDURE — 6370000000 HC RX 637 (ALT 250 FOR IP): Performed by: SURGERY

## 2019-06-09 PROCEDURE — 96374 THER/PROPH/DIAG INJ IV PUSH: CPT

## 2019-06-09 PROCEDURE — G0378 HOSPITAL OBSERVATION PER HR: HCPCS

## 2019-06-09 RX ADMIN — HYDROMORPHONE HYDROCHLORIDE 0.5 MG: 1 INJECTION, SOLUTION INTRAMUSCULAR; INTRAVENOUS; SUBCUTANEOUS at 00:01

## 2019-06-09 RX ADMIN — OXYCODONE HYDROCHLORIDE AND ACETAMINOPHEN 2 TABLET: 5; 325 TABLET ORAL at 08:07

## 2019-06-09 RX ADMIN — OXYCODONE HYDROCHLORIDE AND ACETAMINOPHEN 2 TABLET: 5; 325 TABLET ORAL at 12:12

## 2019-06-09 RX ADMIN — OXYCODONE HYDROCHLORIDE AND ACETAMINOPHEN 2 TABLET: 5; 325 TABLET ORAL at 02:42

## 2019-06-09 ASSESSMENT — PAIN - FUNCTIONAL ASSESSMENT
PAIN_FUNCTIONAL_ASSESSMENT: ACTIVITIES ARE NOT PREVENTED

## 2019-06-09 ASSESSMENT — PAIN DESCRIPTION - FREQUENCY
FREQUENCY: CONTINUOUS

## 2019-06-09 ASSESSMENT — PAIN SCALES - GENERAL
PAINLEVEL_OUTOF10: 7
PAINLEVEL_OUTOF10: 7
PAINLEVEL_OUTOF10: 5
PAINLEVEL_OUTOF10: 8
PAINLEVEL_OUTOF10: 0
PAINLEVEL_OUTOF10: 0
PAINLEVEL_OUTOF10: 7

## 2019-06-09 ASSESSMENT — PAIN DESCRIPTION - LOCATION
LOCATION: ABDOMEN

## 2019-06-09 ASSESSMENT — PAIN DESCRIPTION - ORIENTATION
ORIENTATION: RIGHT;LEFT
ORIENTATION: RIGHT;MID
ORIENTATION: RIGHT;LEFT
ORIENTATION: RIGHT;MID
ORIENTATION: MID

## 2019-06-09 ASSESSMENT — PAIN DESCRIPTION - PAIN TYPE
TYPE: ACUTE PAIN
TYPE: ACUTE PAIN
TYPE: SURGICAL PAIN

## 2019-06-09 ASSESSMENT — PAIN DESCRIPTION - PROGRESSION
CLINICAL_PROGRESSION: GRADUALLY WORSENING
CLINICAL_PROGRESSION: GRADUALLY WORSENING
CLINICAL_PROGRESSION: GRADUALLY IMPROVING
CLINICAL_PROGRESSION: GRADUALLY IMPROVING
CLINICAL_PROGRESSION: GRADUALLY WORSENING

## 2019-06-09 ASSESSMENT — PAIN DESCRIPTION - ONSET
ONSET: ON-GOING

## 2019-06-09 ASSESSMENT — PAIN DESCRIPTION - DESCRIPTORS
DESCRIPTORS: ACHING;CONSTANT;DISCOMFORT
DESCRIPTORS: ACHING;DISCOMFORT;DULL
DESCRIPTORS: ACHING;DISCOMFORT;DULL
DESCRIPTORS: ACHING;CONSTANT;DISCOMFORT
DESCRIPTORS: ACHING;CONSTANT;DISCOMFORT

## 2019-06-09 NOTE — PLAN OF CARE
Problem: Falls - Risk of:  Goal: Will remain free from falls  Description  Will remain free from falls  6/8/2019 2300 by Salvador Cordova RN  Outcome: Met This Shift    Goal: Absence of physical injury  Description  Absence of physical injury  Outcome: Met This Shift

## 2019-06-10 NOTE — DISCHARGE SUMMARY
Surgery Discharge Summary    Quyen Singh SUMMARY:                The patient is a 41 y.o. female who was admitted to the hospital on 6/8/2019  8:12 AM for treatment of acute appendicitis. Patient underwent laparoscopic appendectomy on 6/8/19. The patient was discharged on 6/9/2019  2:08 PM tolerating a diet, moving bowels, and urinating without difficulty and pain controlled adequately. The incisions were clean and intact. The patient was discharged to home in satisfactory condition with instructions to call for a follow up appointment. Hospital Problem List:  Active Problems:    Acute appendicitis  Resolved Problems:    * No resolved hospital problems. *     Procedure(s) (LRB):  APPENDECTOMY LAPAROSCOPIC (N/A)    Discharge Medications:    [employer] Medication Instructions HDO:[id number]    Printed on:06/09/19 0855   Medication Information                      buPROPion (WELLBUTRIN) 100 MG tablet  Take 100 mg by mouth daily. cetirizine (ZYRTEC) 10 MG tablet  Take 10 mg by mouth daily             Cholecalciferol (VITAMIN D3) 3000 UNITS TABS  Take 2,000 Int'l Units by mouth daily. cyanocobalamin 1000 MCG/ML injection  Inject 1,000 mcg into the muscle every 30 days. desvenlafaxine (PRISTIQ) 50 MG TB24  Take 50 mg by mouth daily. LORazepam (ATIVAN) 0.5 MG tablet  Take 0.5 mg by mouth every 6 hours as needed. norethindrone (AYGESTIN) 5 MG tablet  Take 1.5 mg by mouth daily. oxyCODONE-acetaminophen (PERCOCET) 5-325 MG per tablet  Take 1 tablet by mouth every 6 hours as needed for Pain for up to 5 days. Intended supply: 5 days. Take lowest dose possible to manage pain             promethazine (PHENERGAN) 12.5 MG tablet  Take 25 mg by mouth as needed.                  Kenneth Knappessa  6/9/2019

## 2019-06-13 ENCOUNTER — HOSPITAL ENCOUNTER (EMERGENCY)
Age: 39
Discharge: HOME | End: 2019-06-13
Attending: EMERGENCY MEDICINE
Payer: COMMERCIAL

## 2019-06-13 ENCOUNTER — APPOINTMENT (OUTPATIENT)
Dept: CT IMAGING | Age: 39
End: 2019-06-13
Payer: COMMERCIAL

## 2019-06-13 VITALS
BODY MASS INDEX: 25.76 KG/M2 | SYSTOLIC BLOOD PRESSURE: 117 MMHG | WEIGHT: 170 LBS | HEART RATE: 83 BPM | RESPIRATION RATE: 18 BRPM | TEMPERATURE: 98 F | DIASTOLIC BLOOD PRESSURE: 73 MMHG | OXYGEN SATURATION: 97 % | HEIGHT: 68 IN

## 2019-06-13 DIAGNOSIS — G89.18 POST-OP PAIN: Primary | ICD-10-CM

## 2019-06-13 LAB
ALBUMIN SERPL-MCNC: 4 G/DL (ref 3.5–5.2)
ALP BLD-CCNC: 97 U/L (ref 35–104)
ALT SERPL-CCNC: 152 U/L (ref 0–32)
AMORPHOUS: ABNORMAL
ANION GAP SERPL CALCULATED.3IONS-SCNC: 13 MMOL/L (ref 7–16)
AST SERPL-CCNC: 106 U/L (ref 0–31)
BACTERIA: ABNORMAL /HPF
BASOPHILS ABSOLUTE: 0.05 E9/L (ref 0–0.2)
BASOPHILS RELATIVE PERCENT: 0.9 % (ref 0–2)
BILIRUB SERPL-MCNC: 0.3 MG/DL (ref 0–1.2)
BILIRUBIN URINE: NEGATIVE
BLOOD, URINE: NEGATIVE
BUN BLDV-MCNC: 13 MG/DL (ref 6–20)
CALCIUM SERPL-MCNC: 9.5 MG/DL (ref 8.6–10.2)
CHLORIDE BLD-SCNC: 104 MMOL/L (ref 98–107)
CLARITY: NORMAL
CO2: 26 MMOL/L (ref 22–29)
COLOR: YELLOW
CREAT SERPL-MCNC: 0.8 MG/DL (ref 0.5–1)
EOSINOPHILS ABSOLUTE: 0.32 E9/L (ref 0.05–0.5)
EOSINOPHILS RELATIVE PERCENT: 5.5 % (ref 0–6)
EPITHELIAL CELLS, UA: ABNORMAL /HPF
GFR AFRICAN AMERICAN: >60
GFR NON-AFRICAN AMERICAN: >60 ML/MIN/1.73
GLUCOSE BLD-MCNC: 97 MG/DL (ref 74–99)
GLUCOSE URINE: NEGATIVE MG/DL
HCG QUALITATIVE: NEGATIVE
HCG, URINE, POC: NEGATIVE
HCT VFR BLD CALC: 39.1 % (ref 34–48)
HEMOGLOBIN: 13.4 G/DL (ref 11.5–15.5)
IMMATURE GRANULOCYTES #: 0.01 E9/L
IMMATURE GRANULOCYTES %: 0.2 % (ref 0–5)
KETONES, URINE: NEGATIVE MG/DL
LACTIC ACID: 0.7 MMOL/L (ref 0.5–2.2)
LEUKOCYTE ESTERASE, URINE: NEGATIVE
LIPASE: 13 U/L (ref 13–60)
LYMPHOCYTES ABSOLUTE: 2.18 E9/L (ref 1.5–4)
LYMPHOCYTES RELATIVE PERCENT: 37.3 % (ref 20–42)
Lab: NORMAL
MCH RBC QN AUTO: 30 PG (ref 26–35)
MCHC RBC AUTO-ENTMCNC: 34.3 % (ref 32–34.5)
MCV RBC AUTO: 87.7 FL (ref 80–99.9)
MONOCYTES ABSOLUTE: 0.65 E9/L (ref 0.1–0.95)
MONOCYTES RELATIVE PERCENT: 11.1 % (ref 2–12)
NEGATIVE QC PASS/FAIL: NORMAL
NEUTROPHILS ABSOLUTE: 2.64 E9/L (ref 1.8–7.3)
NEUTROPHILS RELATIVE PERCENT: 45 % (ref 43–80)
NITRITE, URINE: NEGATIVE
PDW BLD-RTO: 12.4 FL (ref 11.5–15)
PH UA: 6.5 (ref 5–9)
PLATELET # BLD: 212 E9/L (ref 130–450)
PMV BLD AUTO: 10.6 FL (ref 7–12)
POSITIVE QC PASS/FAIL: NORMAL
POTASSIUM SERPL-SCNC: 4.2 MMOL/L (ref 3.5–5)
PROTEIN UA: NEGATIVE MG/DL
RBC # BLD: 4.46 E12/L (ref 3.5–5.5)
RBC UA: ABNORMAL /HPF (ref 0–2)
SODIUM BLD-SCNC: 143 MMOL/L (ref 132–146)
SPECIFIC GRAVITY UA: 1.01 (ref 1–1.03)
TOTAL PROTEIN: 6.5 G/DL (ref 6.4–8.3)
UROBILINOGEN, URINE: 0.2 E.U./DL
WBC # BLD: 5.9 E9/L (ref 4.5–11.5)
WBC UA: ABNORMAL /HPF (ref 0–5)

## 2019-06-13 PROCEDURE — 84703 CHORIONIC GONADOTROPIN ASSAY: CPT

## 2019-06-13 PROCEDURE — 2580000003 HC RX 258: Performed by: EMERGENCY MEDICINE

## 2019-06-13 PROCEDURE — 6360000004 HC RX CONTRAST MEDICATION: Performed by: RADIOLOGY

## 2019-06-13 PROCEDURE — 81001 URINALYSIS AUTO W/SCOPE: CPT

## 2019-06-13 PROCEDURE — 87040 BLOOD CULTURE FOR BACTERIA: CPT

## 2019-06-13 PROCEDURE — 85025 COMPLETE CBC W/AUTO DIFF WBC: CPT

## 2019-06-13 PROCEDURE — 83605 ASSAY OF LACTIC ACID: CPT

## 2019-06-13 PROCEDURE — 96374 THER/PROPH/DIAG INJ IV PUSH: CPT

## 2019-06-13 PROCEDURE — 99284 EMERGENCY DEPT VISIT MOD MDM: CPT

## 2019-06-13 PROCEDURE — 80053 COMPREHEN METABOLIC PANEL: CPT

## 2019-06-13 PROCEDURE — 83690 ASSAY OF LIPASE: CPT

## 2019-06-13 PROCEDURE — 96375 TX/PRO/DX INJ NEW DRUG ADDON: CPT

## 2019-06-13 PROCEDURE — 6360000002 HC RX W HCPCS: Performed by: EMERGENCY MEDICINE

## 2019-06-13 PROCEDURE — 74177 CT ABD & PELVIS W/CONTRAST: CPT

## 2019-06-13 RX ORDER — 0.9 % SODIUM CHLORIDE 0.9 %
1000 INTRAVENOUS SOLUTION INTRAVENOUS ONCE
Status: COMPLETED | OUTPATIENT
Start: 2019-06-13 | End: 2019-06-13

## 2019-06-13 RX ORDER — MORPHINE SULFATE 2 MG/ML
4 INJECTION, SOLUTION INTRAMUSCULAR; INTRAVENOUS ONCE
Status: COMPLETED | OUTPATIENT
Start: 2019-06-13 | End: 2019-06-13

## 2019-06-13 RX ORDER — ONDANSETRON 2 MG/ML
4 INJECTION INTRAMUSCULAR; INTRAVENOUS ONCE
Status: COMPLETED | OUTPATIENT
Start: 2019-06-13 | End: 2019-06-13

## 2019-06-13 RX ORDER — OXYCODONE HYDROCHLORIDE AND ACETAMINOPHEN 5; 325 MG/1; MG/1
1 TABLET ORAL EVERY 6 HOURS PRN
Qty: 12 TABLET | Refills: 0 | Status: SHIPPED | OUTPATIENT
Start: 2019-06-13 | End: 2019-06-16

## 2019-06-13 RX ADMIN — ONDANSETRON 4 MG: 2 INJECTION INTRAMUSCULAR; INTRAVENOUS at 06:30

## 2019-06-13 RX ADMIN — SODIUM CHLORIDE 1000 ML: 9 INJECTION, SOLUTION INTRAVENOUS at 06:30

## 2019-06-13 RX ADMIN — IOPAMIDOL 110 ML: 755 INJECTION, SOLUTION INTRAVENOUS at 08:06

## 2019-06-13 RX ADMIN — MORPHINE SULFATE 4 MG: 2 INJECTION, SOLUTION INTRAMUSCULAR; INTRAVENOUS at 06:30

## 2019-06-13 ASSESSMENT — PAIN SCALES - GENERAL
PAINLEVEL_OUTOF10: 3
PAINLEVEL_OUTOF10: 7
PAINLEVEL_OUTOF10: 6

## 2019-06-13 ASSESSMENT — PAIN DESCRIPTION - ORIENTATION
ORIENTATION: LEFT
ORIENTATION: LEFT

## 2019-06-13 ASSESSMENT — PAIN DESCRIPTION - DESCRIPTORS: DESCRIPTORS: STABBING;SHARP

## 2019-06-13 ASSESSMENT — PAIN DESCRIPTION - PAIN TYPE
TYPE: ACUTE PAIN
TYPE: ACUTE PAIN

## 2019-06-13 ASSESSMENT — PAIN DESCRIPTION - FREQUENCY: FREQUENCY: CONTINUOUS

## 2019-06-13 ASSESSMENT — PAIN DESCRIPTION - LOCATION
LOCATION: ABDOMEN
LOCATION: ABDOMEN

## 2019-06-13 NOTE — ED NOTES
Pt states she had appendectomy on 6/8/2019 (Saturday), ran out of her percocet yesterday and continues to have pain to LLQ abd, spoke to surgeon but would not renew pain meds has appointment for this afternoon but pain has been too severe to wait. Denies nausea or vomiting. Ecchymosis noted to umbilicus with laproscopic incision noted to umbilicus and LLQ abd, abd soft with audible bowel sounds.        Shania [facility]  06/13/19 1132

## 2019-06-13 NOTE — ED TRIAGE NOTES
Had appendectomy on Saturday 6/8/2019, ran out of percocet yesterday has been having increased pain to LLQ abd.   States some nausea but no vomiting

## 2019-06-18 LAB — BLOOD CULTURE, ROUTINE: NORMAL

## 2019-06-23 NOTE — ED PROVIDER NOTES
HPI:  6/23/19, Time: 8:58 AM         Keyla Fabian is a 42 y.o. female presenting to the ED for abdominal pain, beginning today. The complaint has been constant, moderate in severity, and worsened by nothing. Denies nausea vomiting or stool change. Denies fever or chills. Review of Systems:   Pertinent positives and negatives are stated within HPI, all other systems reviewed and are negative.          --------------------------------------------- PAST HISTORY ---------------------------------------------  Past Medical History:  has a past medical history of Asthma, Clotting disorder ([facility] [address] Endometriosis, Gum disease, MTHFR mutation ([facility] Osteopenia, Pernicious anemia, Premature ovarian failure, Raynaud's disease, Skin mole, and Tachycardia. Past Surgical History:  has a past surgical history that includes cyst removal (5/2000); Colonoscopy (2000); Upper gastrointestinal endoscopy (2000); skin biopsy; other surgical history (3/26/15 excision lesion lt upper arm); laparoscopic appendectomy (N/A, 6/8/2019); and Appendectomy. Social History:  reports that she quit smoking about 18 years ago. Her smoking use included cigarettes. She has never used smokeless tobacco. She reports that she does not drink alcohol or use drugs. Family History: family history includes Colon Cancer in her paternal grandfather and paternal grandmother; Diabetes in her maternal grandfather; Emphysema in her maternal grandfather; Heart Disease in her paternal grandmother; High Cholesterol in her mother; Ulcerative Colitis in her father. The patients home medications have been reviewed. Allergies: Hydroxyprogesterone; Adhesive tape; Clindamycin/lincomycin;  Codeine; and Prednisone    -------------------------------------------------- RESULTS -------------------------------------------------  All laboratory and radiology results have been personally reviewed by myself   LABS:  Results for orders placed or performed during the hospital encounter of 06/13/19   Culture Blood #1   Result Value Ref Range    Blood Culture, Routine 5 Days- no growth    CBC Auto Differential   Result Value Ref Range    WBC 5.9 4.5 - 11.5 E9/L    RBC 4.46 3.50 - 5.50 E12/L    Hemoglobin 13.4 11.5 - 15.5 g/dL    Hematocrit 39.1 34.0 - 48.0 %    MCV 87.7 80.0 - 99.9 fL    MCH 30.0 26.0 - 35.0 pg    MCHC 34.3 32.0 - 34.5 %    RDW 12.4 11.5 - 15.0 fL    Platelets 921 308 - 256 E9/L    MPV 10.6 7.0 - 12.0 fL    Neutrophils % 45.0 43.0 - 80.0 %    Immature Granulocytes % 0.2 0.0 - 5.0 %    Lymphocytes % 37.3 20.0 - 42.0 %    Monocytes % 11.1 2.0 - 12.0 %    Eosinophils % 5.5 0.0 - 6.0 %    Basophils % 0.9 0.0 - 2.0 %    Neutrophils # 2.64 1.80 - 7.30 E9/L    Immature Granulocytes # 0.01 E9/L    Lymphocytes # 2.18 1.50 - 4.00 E9/L    Monocytes # 0.65 0.10 - 0.95 E9/L    Eosinophils # 0.32 0.05 - 0.50 E9/L    Basophils # 0.05 0.00 - 0.20 E9/L   Comprehensive Metabolic Panel   Result Value Ref Range    Sodium 143 132 - 146 mmol/L    Potassium 4.2 3.5 - 5.0 mmol/L    Chloride 104 98 - 107 mmol/L    CO2 26 22 - 29 mmol/L    Anion Gap 13 7 - 16 mmol/L    Glucose 97 74 - 99 mg/dL    BUN 13 6 - 20 mg/dL    CREATININE 0.8 0.5 - 1.0 mg/dL    GFR Non-African American >60 >=60 mL/min/1.73    GFR African American >60     Calcium 9.5 8.6 - 10.2 mg/dL    Total Protein 6.5 6.4 - 8.3 g/dL    Alb 4.0 3.5 - 5.2 g/dL    Total Bilirubin 0.3 0.0 - 1.2 mg/dL    Alkaline Phosphatase 97 35 - 104 U/L     (H) 0 - 32 U/L     (H) 0 - 31 U/L   Lactic Acid, Plasma   Result Value Ref Range    Lactic Acid 0.7 0.5 - 2.2 mmol/L   Lipase   Result Value Ref Range    Lipase 13 13 - 60 U/L   Urinalysis   Result Value Ref Range    Color, UA Yellow Straw/Yellow    Clarity, UA SL CLOUDY Clear    Glucose, Ur Negative Negative mg/dL    Bilirubin Urine Negative Negative    Ketones, Urine Negative Negative mg/dL    Specific Gravity, UA 1.010 1.005 - 1.030    Blood, Urine Negative Negative    pH, UA 6.5 5.0 - 9.0    Protein, UA Negative Negative mg/dL    Urobilinogen, Urine 0.2 <2.0 E.U./dL    Nitrite, Urine Negative Negative    Leukocyte Esterase, Urine Negative Negative   HCG Qualitative, Serum   Result Value Ref Range    hCG Qual NEGATIVE NEGATIVE   Microscopic Urinalysis   Result Value Ref Range    WBC, UA 1-3 0 - 5 /HPF    RBC, UA NONE 0 - 2 /HPF    Epi Cells MODERATE /HPF    Bacteria, UA FEW (A) /HPF    Amorphous, UA FEW    POC Pregnancy Urine   Result Value Ref Range    HCG, Urine, POC Negative Negative    Lot Number [id number]     Positive QC Pass/Fail Pass     Negative QC Pass/Fail Pass        RADIOLOGY:  Interpreted by Radiologist.  CT ABDOMEN PELVIS W IV CONTRAST Additional Contrast? None   Final Result      1. Status post appendectomy. Small amount of free fluid in the pelvis   is nonspecific and likely to relate to simple postoperative change. Very mild extraluminal gas is also likely postoperative. 2. Mild fatty liver.                         ------------------------- NURSING NOTES AND VITALS REVIEWED ---------------------------   The nursing notes within the ED encounter and vital signs as below have been reviewed. /73   Pulse 83   Temp 98 °F (36.7 °C) (Oral)   Resp 18   Ht 5' 8\" (1.727 m)   Wt 170 lb (77.1 kg)   LMP 10/12/2010   SpO2 97%   BMI 25.85 kg/m²   Oxygen Saturation Interpretation: Normal      ---------------------------------------------------PHYSICAL EXAM--------------------------------------      Constitutional/General: Alert and oriented x3, well appearing, non toxic in NAD  Head: Normocephalic and atraumatic  Eyes: PERRL, EOMI  Mouth: Oropharynx clear, handling secretions, no trismus  Neck: Supple, full ROM,   Pulmonary: Lungs clear to auscultation bilaterally, no wheezes, rales, or rhonchi. Not in respiratory distress  Cardiovascular:  Regular rate and rhythm, no murmurs, gallops, or rubs.  2+ distal pulses  Abdomen: Soft, minimal lower tenderness without rebound or guarding, non distended, incision sites c/d/i without erythema or warmth or fluctuance  Extremities: Moves all extremities x 4. Warm and well perfused  Skin: warm and dry without rash  Neurologic: GCS 15,  Psych: Normal Affect      ------------------------------ ED COURSE/MEDICAL DECISION MAKING----------------------  Medications   0.9 % sodium chloride bolus (0 mLs Intravenous Stopped 6/13/19 0916)   morphine (PF) injection 4 mg (4 mg Intravenous Given 6/13/19 0630)   ondansetron (ZOFRAN) injection 4 mg (4 mg Intravenous Given 6/13/19 0630)   iopamidol (ISOVUE-370) 76 % injection 110 mL (110 mLs Intravenous Given 6/13/19 0806)         ED COURSE:       Medical Decision Making:    Patient presents for increased postoperative pain. She has a soft nonsurgical abdomen at this point. I am still concerned for possible abscess formation. CT with IV contrast obtained and reassuring. Rest of work-up reassuring as well. This is all discussed with the patient. We discussed at length signs symptoms which return to the emergency department as well as importance of outpatient follow-up. Improved on reevaluation. Counseling: The emergency provider has spoken with the patient and discussed todays results, in addition to providing specific details for the plan of care and counseling regarding the diagnosis and prognosis. Questions are answered at this time and they are agreeable with the plan.      --------------------------------- IMPRESSION AND DISPOSITION ---------------------------------    IMPRESSION  1. Post-op pain        DISPOSITION  Disposition: Discharge to home  Patient condition is good      NOTE: This report was transcribed using voice recognition software.  Every effort was made to ensure accuracy; however, inadvertent computerized transcription errors may be present        Miley Brar MD  06/23/19 7467

## 2019-06-26 ENCOUNTER — APPOINTMENT (OUTPATIENT)
Dept: CT IMAGING | Age: 39
End: 2019-06-26
Payer: COMMERCIAL

## 2019-06-26 ENCOUNTER — HOSPITAL ENCOUNTER (EMERGENCY)
Age: 39
Discharge: HOME | End: 2019-06-26
Attending: EMERGENCY MEDICINE
Payer: COMMERCIAL

## 2019-06-26 VITALS
DIASTOLIC BLOOD PRESSURE: 69 MMHG | TEMPERATURE: 98.1 F | HEIGHT: 68 IN | RESPIRATION RATE: 16 BRPM | WEIGHT: 170 LBS | BODY MASS INDEX: 25.76 KG/M2 | OXYGEN SATURATION: 99 % | HEART RATE: 88 BPM | SYSTOLIC BLOOD PRESSURE: 103 MMHG

## 2019-06-26 DIAGNOSIS — R10.32 LEFT LOWER QUADRANT PAIN: Primary | ICD-10-CM

## 2019-06-26 LAB
ALBUMIN SERPL-MCNC: 4.5 G/DL (ref 3.5–5.2)
ALP BLD-CCNC: 107 U/L (ref 35–104)
ALT SERPL-CCNC: 36 U/L (ref 0–32)
ANION GAP SERPL CALCULATED.3IONS-SCNC: 8 MMOL/L (ref 7–16)
AST SERPL-CCNC: 26 U/L (ref 0–31)
BACTERIA: NORMAL /HPF
BASOPHILS ABSOLUTE: 0.03 E9/L (ref 0–0.2)
BASOPHILS RELATIVE PERCENT: 0.5 % (ref 0–2)
BILIRUB SERPL-MCNC: 0.5 MG/DL (ref 0–1.2)
BILIRUBIN URINE: NEGATIVE
BLOOD, URINE: NEGATIVE
BUN BLDV-MCNC: 17 MG/DL (ref 6–20)
CALCIUM SERPL-MCNC: 9.6 MG/DL (ref 8.6–10.2)
CHLORIDE BLD-SCNC: 103 MMOL/L (ref 98–107)
CLARITY: CLEAR
CO2: 29 MMOL/L (ref 22–29)
COLOR: YELLOW
CREAT SERPL-MCNC: 0.9 MG/DL (ref 0.5–1)
EOSINOPHILS ABSOLUTE: 0.1 E9/L (ref 0.05–0.5)
EOSINOPHILS RELATIVE PERCENT: 1.6 % (ref 0–6)
EPITHELIAL CELLS, UA: NORMAL /HPF
GFR AFRICAN AMERICAN: >60
GFR NON-AFRICAN AMERICAN: >60 ML/MIN/1.73
GLUCOSE BLD-MCNC: 105 MG/DL (ref 74–99)
GLUCOSE URINE: NEGATIVE MG/DL
HCT VFR BLD CALC: 41.4 % (ref 34–48)
HEMOGLOBIN: 13.9 G/DL (ref 11.5–15.5)
IMMATURE GRANULOCYTES #: 0.02 E9/L
IMMATURE GRANULOCYTES %: 0.3 % (ref 0–5)
KETONES, URINE: NEGATIVE MG/DL
LACTIC ACID: 0.6 MMOL/L (ref 0.5–2.2)
LEUKOCYTE ESTERASE, URINE: NEGATIVE
LIPASE: 24 U/L (ref 13–60)
LYMPHOCYTES ABSOLUTE: 2.05 E9/L (ref 1.5–4)
LYMPHOCYTES RELATIVE PERCENT: 33.6 % (ref 20–42)
MCH RBC QN AUTO: 29.8 PG (ref 26–35)
MCHC RBC AUTO-ENTMCNC: 33.6 % (ref 32–34.5)
MCV RBC AUTO: 88.8 FL (ref 80–99.9)
MONOCYTES ABSOLUTE: 0.75 E9/L (ref 0.1–0.95)
MONOCYTES RELATIVE PERCENT: 12.3 % (ref 2–12)
NEUTROPHILS ABSOLUTE: 3.15 E9/L (ref 1.8–7.3)
NEUTROPHILS RELATIVE PERCENT: 51.7 % (ref 43–80)
NITRITE, URINE: NEGATIVE
PDW BLD-RTO: 12.2 FL (ref 11.5–15)
PH UA: 6 (ref 5–9)
PLATELET # BLD: 243 E9/L (ref 130–450)
PMV BLD AUTO: 10.8 FL (ref 7–12)
POTASSIUM SERPL-SCNC: 4.5 MMOL/L (ref 3.5–5)
PROTEIN UA: NEGATIVE MG/DL
RBC # BLD: 4.66 E12/L (ref 3.5–5.5)
RBC UA: NORMAL /HPF (ref 0–2)
SODIUM BLD-SCNC: 140 MMOL/L (ref 132–146)
SPECIFIC GRAVITY UA: 1.01 (ref 1–1.03)
TOTAL PROTEIN: 7.3 G/DL (ref 6.4–8.3)
UROBILINOGEN, URINE: 0.2 E.U./DL
WBC # BLD: 6.1 E9/L (ref 4.5–11.5)
WBC UA: NORMAL /HPF (ref 0–5)

## 2019-06-26 PROCEDURE — 83690 ASSAY OF LIPASE: CPT

## 2019-06-26 PROCEDURE — 80053 COMPREHEN METABOLIC PANEL: CPT

## 2019-06-26 PROCEDURE — 6360000002 HC RX W HCPCS: Performed by: EMERGENCY MEDICINE

## 2019-06-26 PROCEDURE — 85025 COMPLETE CBC W/AUTO DIFF WBC: CPT

## 2019-06-26 PROCEDURE — 2580000003 HC RX 258: Performed by: EMERGENCY MEDICINE

## 2019-06-26 PROCEDURE — 83605 ASSAY OF LACTIC ACID: CPT

## 2019-06-26 PROCEDURE — 74176 CT ABD & PELVIS W/O CONTRAST: CPT

## 2019-06-26 PROCEDURE — 81001 URINALYSIS AUTO W/SCOPE: CPT

## 2019-06-26 PROCEDURE — 96375 TX/PRO/DX INJ NEW DRUG ADDON: CPT

## 2019-06-26 PROCEDURE — 99284 EMERGENCY DEPT VISIT MOD MDM: CPT

## 2019-06-26 PROCEDURE — 96374 THER/PROPH/DIAG INJ IV PUSH: CPT

## 2019-06-26 RX ORDER — KETOROLAC TROMETHAMINE 30 MG/ML
15 INJECTION, SOLUTION INTRAMUSCULAR; INTRAVENOUS ONCE
Status: COMPLETED | OUTPATIENT
Start: 2019-06-26 | End: 2019-06-26

## 2019-06-26 RX ORDER — ONDANSETRON 2 MG/ML
4 INJECTION INTRAMUSCULAR; INTRAVENOUS ONCE
Status: COMPLETED | OUTPATIENT
Start: 2019-06-26 | End: 2019-06-26

## 2019-06-26 RX ORDER — DICYCLOMINE HYDROCHLORIDE 10 MG/1
20 CAPSULE ORAL
Qty: 20 CAPSULE | Refills: 0 | Status: SHIPPED | OUTPATIENT
Start: 2019-06-26 | End: 2019-08-12 | Stop reason: ALTCHOICE

## 2019-06-26 RX ORDER — 0.9 % SODIUM CHLORIDE 0.9 %
1000 INTRAVENOUS SOLUTION INTRAVENOUS ONCE
Status: COMPLETED | OUTPATIENT
Start: 2019-06-26 | End: 2019-06-26

## 2019-06-26 RX ADMIN — SODIUM CHLORIDE 1000 ML: 9 INJECTION, SOLUTION INTRAVENOUS at 06:54

## 2019-06-26 RX ADMIN — ONDANSETRON 4 MG: 2 INJECTION INTRAMUSCULAR; INTRAVENOUS at 07:00

## 2019-06-26 RX ADMIN — KETOROLAC TROMETHAMINE 15 MG: 30 INJECTION, SOLUTION INTRAMUSCULAR at 07:00

## 2019-06-26 ASSESSMENT — PAIN SCALES - GENERAL
PAINLEVEL_OUTOF10: 0
PAINLEVEL_OUTOF10: 7

## 2019-06-26 ASSESSMENT — PAIN DESCRIPTION - DESCRIPTORS: DESCRIPTORS: DISCOMFORT

## 2019-06-26 ASSESSMENT — ENCOUNTER SYMPTOMS
SORE THROAT: 0
COUGH: 0
VOMITING: 0
BACK PAIN: 0
COLOR CHANGE: 0
NAUSEA: 1
SHORTNESS OF BREATH: 0
CONSTIPATION: 1
ABDOMINAL PAIN: 1

## 2019-06-26 ASSESSMENT — PAIN DESCRIPTION - LOCATION: LOCATION: ABDOMEN

## 2019-06-26 ASSESSMENT — PAIN DESCRIPTION - ORIENTATION: ORIENTATION: LEFT;LOWER

## 2019-06-26 NOTE — ED PROVIDER NOTES
Alvaro Lopez is a 42 y.o. female with PMH significant for Raynaud's Disease presenting to the emergency department for Abdominal Pain. Patient complains of left lower quadrant abdominal pain that has been persistent and worsening since she had appendectomy around 2 weeks ago. She complains of associated nausea. Patient has tried taking Percocet with minimal relief. She states her last bowel movement was 2 days ago as she normally only has 1 bowel movement per week. Patient admits she has been in the emergency department for this similar pain. She is also followed up with a general surgeon since the surgery. Patient also has had a hysterectomy in the past.    The history is provided by the patient. Abdominal Pain   Pain location:  LLQ  Pain quality: sharp    Pain radiates to:  Does not radiate  Pain severity:  Moderate  Timing:  Intermittent  Progression:  Worsening  Context: previous surgery    Relieved by:  Nothing  Worsened by:  Palpation  Associated symptoms: constipation and nausea    Associated symptoms: no chest pain, no chills, no cough, no dysuria, no fever, no shortness of breath, no sore throat and no vomiting        Review of Systems   Constitutional: Negative for chills and fever. HENT: Negative for congestion and sore throat. Respiratory: Negative for cough and shortness of breath. Cardiovascular: Negative for chest pain. Gastrointestinal: Positive for abdominal pain, constipation and nausea. Negative for vomiting. Genitourinary: Positive for decreased urine volume and flank pain. Negative for dysuria. Musculoskeletal: Negative for back pain. Skin: Negative for color change. Neurological: Negative for headaches. Psychiatric/Behavioral: Negative for confusion. Physical Exam   Constitutional: She is oriented to person, place, and time. She appears well-developed. No distress. HENT:   Head: Normocephalic and atraumatic.    Nose: Nose normal.   Eyes: Conjunctivae are normal.   Neck: Normal range of motion. Neck supple. Cardiovascular: Normal rate, regular rhythm, intact distal pulses and normal pulses. Pulmonary/Chest: Effort normal. She has no wheezes. She has no rhonchi. She has no rales. Abdominal: Soft. Bowel sounds are normal. She exhibits no pulsatile midline mass. There is tenderness in the left lower quadrant. There is CVA tenderness (left). There is no rigidity, no rebound and no guarding. Neurological: She is alert and oriented to person, place, and time. No sensory deficit. She exhibits normal muscle tone. Skin: Skin is warm and dry. Capillary refill takes less than 2 seconds. Psychiatric: She has a normal mood and affect. Her speech is normal.   Nursing note and vitals reviewed. Procedures    MDM  Number of Diagnoses or Management Options  Left lower quadrant pain:   Diagnosis management comments: Patient presents to the ED for abdominal pain status post appendectomy several weeks ago. We initially obtained blood work, imaging and urine sample. We reviewed patient's prior imaging on the 13th and decided to get CT without contrast to see if we could further evaluate patient's complaint today. Patient was given Zofran, IV fluids and Toradol for their symptoms with moderate improvement. Workup in the ED revealed unspecified left lower quadrant abdominal pain. Results of blood work and UA unremarkable. No evidence of obstruction or underlying infection. No evidence of kidney stone. patient continues to be non-toxic on re-evaluation. Patient is hemodynamically stable. Patient did not have rebound, guarding or rigidity present on exam as patient does not show signs of acute surgical abdomen during this encounter. Findings were discussed with the patient and reasons to immediately return to the ED were articulated to them. They will follow-up with their PMD, OBGYN and general surgery. Patient agrees with the plan and all questions were answered. Amount and/or Complexity of Data Reviewed  Review and summarize past medical records: yes        ED Course as of Jun 26 0906   Wed Jun 26, 2019   0902 Patient reassessed. Upon walking room, patient is laying on her side while on her phone, appearing in no acute distress. Discussed case with patient today. There are no acute findings on CT scan blood work, urinalysis looks unremarkable. We advised patient follow-up again with her general surgeon as well as OB/GYN. Repeat assessment does not show any rebound, guarding or rigidity. She agrees with plan had no further questions    [MA]      ED Course User Index  [MA] Shaun Grey, DO       --------------------------------------------- PAST HISTORY ---------------------------------------------  Past Medical History:  has a past medical history of Asthma, Clotting disorder ([facility] [address] Endometriosis, Gum disease, MTHFR mutation ([facility] [address] Osteopenia, Pernicious anemia, Premature ovarian failure, Raynaud's disease, Skin mole, and Tachycardia. Past Surgical History:  has a past surgical history that includes cyst removal (5/2000); Colonoscopy (2000); Upper gastrointestinal endoscopy (2000); skin biopsy; other surgical history (3/26/15 excision lesion lt upper arm); laparoscopic appendectomy (N/A, 6/8/2019); and Appendectomy. Social History:  reports that she quit smoking about 18 years ago. Her smoking use included cigarettes. She has never used smokeless tobacco. She reports that she does not drink alcohol or use drugs. Family History: family history includes Colon Cancer in her paternal grandfather and paternal grandmother; Diabetes in her maternal grandfather; Emphysema in her maternal grandfather; Heart Disease in her paternal grandmother; High Cholesterol in her mother; Ulcerative Colitis in her father. The patients home medications have been reviewed. Allergies: Hydroxyprogesterone; Adhesive tape; Clindamycin/lincomycin;  Codeine; and Prednisone    -------------------------------------------------- RESULTS -------------------------------------------------  Labs:  Results for orders placed or performed during the hospital encounter of 06/26/19   CBC auto differential   Result Value Ref Range    WBC 6.1 4.5 - 11.5 E9/L    RBC 4.66 3.50 - 5.50 E12/L    Hemoglobin 13.9 11.5 - 15.5 g/dL    Hematocrit 41.4 34.0 - 48.0 %    MCV 88.8 80.0 - 99.9 fL    MCH 29.8 26.0 - 35.0 pg    MCHC 33.6 32.0 - 34.5 %    RDW 12.2 11.5 - 15.0 fL    Platelets 882 168 - 932 E9/L    MPV 10.8 7.0 - 12.0 fL    Neutrophils % 51.7 43.0 - 80.0 %    Immature Granulocytes % 0.3 0.0 - 5.0 %    Lymphocytes % 33.6 20.0 - 42.0 %    Monocytes % 12.3 (H) 2.0 - 12.0 %    Eosinophils % 1.6 0.0 - 6.0 %    Basophils % 0.5 0.0 - 2.0 %    Neutrophils # 3.15 1.80 - 7.30 E9/L    Immature Granulocytes # 0.02 E9/L    Lymphocytes # 2.05 1.50 - 4.00 E9/L    Monocytes # 0.75 0.10 - 0.95 E9/L    Eosinophils # 0.10 0.05 - 0.50 E9/L    Basophils # 0.03 0.00 - 0.20 E9/L   Comprehensive Metabolic Panel   Result Value Ref Range    Sodium 140 132 - 146 mmol/L    Potassium 4.5 3.5 - 5.0 mmol/L    Chloride 103 98 - 107 mmol/L    CO2 29 22 - 29 mmol/L    Anion Gap 8 7 - 16 mmol/L    Glucose 105 (H) 74 - 99 mg/dL    BUN 17 6 - 20 mg/dL    CREATININE 0.9 0.5 - 1.0 mg/dL    GFR Non-African American >60 >=60 mL/min/1.73    GFR African American >60     Calcium 9.6 8.6 - 10.2 mg/dL    Total Protein 7.3 6.4 - 8.3 g/dL    Alb 4.5 3.5 - 5.2 g/dL    Total Bilirubin 0.5 0.0 - 1.2 mg/dL    Alkaline Phosphatase 107 (H) 35 - 104 U/L    ALT 36 (H) 0 - 32 U/L    AST 26 0 - 31 U/L   Lactic Acid, Plasma   Result Value Ref Range    Lactic Acid 0.6 0.5 - 2.2 mmol/L   Urinalysis with Microscopic   Result Value Ref Range    Color, UA Yellow Straw/Yellow    Clarity, UA Clear Clear    Glucose, Ur Negative Negative mg/dL    Bilirubin Urine Negative Negative    Ketones, Urine Negative Negative mg/dL    Specific Gravity, UA 1.015 1.005 tomorrow. --------------------------------- ADDITIONAL PROVIDER NOTES ---------------------------------  At this time the patient is without objective evidence of an acute process requiring hospitalization or inpatient management. They have remained hemodynamically stable throughout their entire ED visit and are stable for discharge with outpatient follow-up. The plan has been discussed in detail and they are aware of the specific conditions for emergent return, as well as the importance of follow-up. New Prescriptions    DICYCLOMINE (BENTYL) 10 MG CAPSULE    Take 2 capsules by mouth 4 times daily (before meals and nightly)       Diagnosis:  1. Left lower quadrant pain        Disposition:  Patient's disposition: Discharge to home  Patient's condition is stable.             Zohaib Wagner DO  Resident  06/26/19 8503

## 2019-06-26 NOTE — ED NOTES
Name: Melisa Blanotn  : 1980  MRN: [id number]    Date: 2019    Benefits of immediately proceeding with Radiology exam outweigh the risks and therefore the following is being waived:      [x] Pregnancy test    [] Protocol for Iodine allergy    [] MRI questionnaire    [] BUN/Creatinine        DO Anya Palomares DO  19 1066

## 2019-07-02 ENCOUNTER — TELEPHONE (OUTPATIENT)
Dept: NON INVASIVE DIAGNOSTICS | Age: 39
End: 2019-07-02

## 2019-07-02 NOTE — TELEPHONE ENCOUNTER
LM to schedule new patient appointment with [facility] dx: Sinus tachycardia referring from Dr Deshawn Muniz.

## 2019-07-31 NOTE — PROGRESS NOTES
[address] [facility] [facility] Electrophysiology  Consultation Report  PATIENT: Torri Bullock  MEDICAL RECORD NUMBER: [id number]  DATE OF SERVICE:  8/7/2019  ATTENDING ELECTROPHYSIOLOGIST: Michelle Corona MD  REFERRING PHYSICIAN:  Rody Anderson MD  CHIEF COMPLAINT: tachycardia and dizziness    HPI: This is a 42 y.o. female with a history of   Patient Active Problem List   Diagnosis    Pregnancy with history of pre-term labor    Other current maternal conditions classifiable elsewhere, antepartum    Placenta previa without hemorrhage, antepartum    High-risk pregnancy    Asthma    Other procreative management counseling and advice    Personal history of other genital system and obstetric disorders(V13.29)    Primary hypercoagulable state ([facility] [address]    Acute appendicitis   who presents to cardiac electrophysiology clinic for consultation of tachycardia and dizziness. Ms. Valentin Pepper did see Dr. Jody Chavira in the past in 2015. At that time she had extensive workup done by Dr. Cezar Cheney which revealed no structural cardiac abnormality, and no arrhythmias on 30-day monitor. Since that time Ms. Valentin Pepper states she has been having increased episodes of \"racing\" heart rates, which she states her heart rate can go as high as 145 bpm. Her symptoms can occur at anytime even with laying down. She goes on to report that her episodes are accompanied with dizziness but denies chest pain, dyspnea or syncope. She reports her elevated HR's can occur at rest and with activity. She reports a family history of cardiac disease but denies SCD in family. She presents today in sinus rhythm. Discussed with her about obtaining a TTE for updated structure and function, wearing a 30 day MCOT to determine any arrhythmias and will consider a tilt table test to rule out POTS. She reports normal TFT which was done recently. She denies using caffeine.     Patient Active Problem List    Diagnosis Date Noted    Acute appendicitis 2019    Other procreative management counseling and advice 08/15/2014    Personal history of other genital system and obstetric disorders(V13.29) 08/15/2014    Primary hypercoagulable state ([facility] [address] 08/15/2014    Pregnancy with history of pre-term labor 2011    Other current maternal conditions classifiable elsewhere, antepartum 2011    Placenta previa without hemorrhage, antepartum 2011    High-risk pregnancy 2011    Asthma      Overview Note:     Uses albuterol when necessary. Past Medical History:   Diagnosis Date    Asthma     Uses albuterol when necessary.  Clotting disorder ([facility] [address]     NEISHA-1    Endometriosis     Gum disease     MTHFR mutation ([facility]     1298    Osteopenia     Pernicious anemia     Premature ovarian failure     Raynaud's disease     Skin mole     precancerous mole removed in 2011    Tachycardia 2014    holter monitor done , no ectopy, hormone induceed ? Family History   Problem Relation Age of Onset    Colon Cancer Paternal Grandfather     Colon Cancer Paternal Grandmother     Heart Disease Paternal Grandmother     Diabetes Maternal Grandfather     Emphysema Maternal Grandfather     Ulcerative Colitis Father     High Cholesterol Mother        Social History     Tobacco Use    Smoking status: Former Smoker     Packs/day: 0.25     Years: 0.50     Pack years: 0.12     Types: Cigarettes     Last attempt to quit: 2000     Years since quittin.7    Smokeless tobacco: Never Used   Substance Use Topics    Alcohol use: No       Current Outpatient Medications   Medication Sig Dispense Refill    gabapentin (NEURONTIN) 100 MG capsule TAKE 1 CAPSULE BY MOUTH EVERYDAY AT BEDTIME  0    buPROPion (WELLBUTRIN) 100 MG tablet Take 100 mg by mouth daily.  Cholecalciferol (VITAMIN D3) 3000 UNITS TABS Take 2,000 Int'l Units by mouth daily.  desvenlafaxine (PRISTIQ) 50 MG TB24 Take 50 mg by mouth daily.

## 2019-08-07 ENCOUNTER — OFFICE VISIT (OUTPATIENT)
Dept: NON INVASIVE DIAGNOSTICS | Age: 39
End: 2019-08-07
Payer: COMMERCIAL

## 2019-08-07 VITALS
SYSTOLIC BLOOD PRESSURE: 122 MMHG | HEIGHT: 68 IN | RESPIRATION RATE: 18 BRPM | HEART RATE: 92 BPM | DIASTOLIC BLOOD PRESSURE: 84 MMHG | BODY MASS INDEX: 26.07 KG/M2 | WEIGHT: 172 LBS

## 2019-08-07 DIAGNOSIS — R00.0 TACHYCARDIA: Primary | ICD-10-CM

## 2019-08-07 PROCEDURE — 1036F TOBACCO NON-USER: CPT | Performed by: INTERNAL MEDICINE

## 2019-08-07 PROCEDURE — 99205 OFFICE O/P NEW HI 60 MIN: CPT | Performed by: INTERNAL MEDICINE

## 2019-08-07 PROCEDURE — G8427 DOCREV CUR MEDS BY ELIG CLIN: HCPCS | Performed by: INTERNAL MEDICINE

## 2019-08-07 PROCEDURE — G8419 CALC BMI OUT NRM PARAM NOF/U: HCPCS | Performed by: INTERNAL MEDICINE

## 2019-08-07 PROCEDURE — 93000 ELECTROCARDIOGRAM COMPLETE: CPT | Performed by: INTERNAL MEDICINE

## 2019-08-07 RX ORDER — GABAPENTIN 100 MG/1
CAPSULE ORAL
Refills: 0 | COMMUNITY
Start: 2019-07-25 | End: 2022-07-06

## 2019-08-09 ENCOUNTER — TELEPHONE (OUTPATIENT)
Dept: CARDIAC CATH/INVASIVE PROCEDURES | Age: 39
End: 2019-08-09

## 2019-08-09 NOTE — TELEPHONE ENCOUNTER
Reminded patient of scheduled procedure on 8/12/19 @ 0730 to arrive @ 0630 and go to Registration first.

## 2019-08-12 ENCOUNTER — HOSPITAL ENCOUNTER (OUTPATIENT)
Dept: CARDIAC CATH/INVASIVE PROCEDURES | Age: 39
Discharge: HOME | End: 2019-08-12
Payer: COMMERCIAL

## 2019-08-12 VITALS
SYSTOLIC BLOOD PRESSURE: 120 MMHG | DIASTOLIC BLOOD PRESSURE: 78 MMHG | HEIGHT: 68 IN | OXYGEN SATURATION: 99 % | HEART RATE: 99 BPM | TEMPERATURE: 97.3 F | WEIGHT: 168 LBS | BODY MASS INDEX: 25.46 KG/M2

## 2019-08-12 PROCEDURE — 93660 TILT TABLE EVALUATION: CPT | Performed by: INTERNAL MEDICINE

## 2019-08-12 PROCEDURE — 2580000003 HC RX 258: Performed by: INTERNAL MEDICINE

## 2019-08-12 RX ORDER — SODIUM CHLORIDE 9 MG/ML
INJECTION, SOLUTION INTRAVENOUS CONTINUOUS
Status: DISCONTINUED | OUTPATIENT
Start: 2019-08-12 | End: 2019-08-13 | Stop reason: HOSPADM

## 2019-08-12 RX ADMIN — SODIUM CHLORIDE: 9 INJECTION, SOLUTION INTRAVENOUS at 07:27

## 2019-08-16 ENCOUNTER — HOSPITAL ENCOUNTER (OUTPATIENT)
Dept: CARDIOLOGY | Age: 39
Discharge: HOME | End: 2019-08-16
Payer: COMMERCIAL

## 2019-08-16 DIAGNOSIS — R00.0 TACHYCARDIA: ICD-10-CM

## 2019-08-16 LAB
LV EF: 58 %
LVEF MODALITY: NORMAL

## 2019-08-16 PROCEDURE — 93306 TTE W/DOPPLER COMPLETE: CPT | Performed by: PSYCHIATRY & NEUROLOGY

## 2019-08-19 ENCOUNTER — TELEPHONE (OUTPATIENT)
Dept: NON INVASIVE DIAGNOSTICS | Age: 39
End: 2019-08-19

## 2019-08-19 NOTE — TELEPHONE ENCOUNTER
Vini Sat called back.  I told her that her echo results are normal. Patient verbalized understanding

## 2019-08-28 ENCOUNTER — TELEPHONE (OUTPATIENT)
Dept: NON INVASIVE DIAGNOSTICS | Age: 39
End: 2019-08-28

## 2019-09-09 DIAGNOSIS — R00.0 TACHYCARDIA: ICD-10-CM

## 2019-09-10 ENCOUNTER — TELEPHONE (OUTPATIENT)
Dept: NON INVASIVE DIAGNOSTICS | Age: 39
End: 2019-09-10

## 2019-09-10 NOTE — TELEPHONE ENCOUNTER
----- Message from Ricci Vazquez MD sent at 9/10/2019 10:39 AM EDT -----  Please let her know that symptoms correlated with sinus rhythm, sinus tachycardia and PACs as well as atrial arrhythmias of 4 and 5 beats in duration. Nothing needs to be done. If it is bother her can try Toprol XL 25 mg daily.  Thanks.  ----- Message -----  From: Elisha Kidd  Sent: 9/10/2019   8:18 AM EDT  To: Ricci Vazquez MD

## 2019-09-11 RX ORDER — METOPROLOL SUCCINATE 25 MG/1
25 TABLET, EXTENDED RELEASE ORAL DAILY
Qty: 30 TABLET | Refills: 3 | Status: SHIPPED | OUTPATIENT
Start: 2019-09-11 | End: 2019-11-10 | Stop reason: SDUPTHER

## 2019-09-11 RX ORDER — METOPROLOL SUCCINATE 25 MG/1
25 TABLET, EXTENDED RELEASE ORAL DAILY
COMMUNITY
End: 2019-09-11 | Stop reason: SDUPTHER

## 2019-09-11 NOTE — PROGRESS NOTES
[address] and Vascular [address] [facility] Electrophysiology  Progress Report  PATIENT: Shiva Bailey  MEDICAL RECORD NUMBER: [id number]  DATE OF SERVICE:  9/18/2019  ATTENDING ELECTROPHYSIOLOGIST: John Conrad MD  REFERRING PHYSICIAN:  Sandra Sanchez MD  CHIEF COMPLAINT: tachycardia and dizziness    HPI: This is a 42 y.o. female with a history of   Patient Active Problem List   Diagnosis    Pregnancy with history of pre-term labor    Other current maternal conditions classifiable elsewhere, antepartum    Placenta previa without hemorrhage, antepartum    High-risk pregnancy    Asthma    Other procreative management counseling and advice    Personal history of other genital system and obstetric disorders(V13.29)    Primary hypercoagulable state ([address]    Acute appendicitis    Dizziness    Vasovagal syncope   who presents to cardiac electrophysiology clinic for follow up. Since her last office visit Ms. Joana Pantoja had a TTE which was unremarkable (see below), she wore a 30 day MCOT that showed normal sinus rhythm. Symptoms correlated with sinus rhythm, sinus tachycardia and PACs as well as atrial arrhythmias of 4 and 5 beats in duration. Also she underwent a tilt table in August 2019 which was positive for neurocardiogenic syncope. She states she has not started on Toprol XL due to fear of her BP dropping. She presents today in SR. The patient denies any chest pain, dyspnea, syncope, orthopnea or paroxysmal nocturnal dyspnea.      Patient Active Problem List    Diagnosis Date Noted    Dizziness     Vasovagal syncope     Acute appendicitis 06/08/2019    Other procreative management counseling and advice 08/15/2014    Personal history of other genital system and obstetric disorders(V13.29) 08/15/2014    Primary hypercoagulable state ([address] 08/15/2014    Pregnancy with history of pre-term labor 02/02/2011    Other current maternal conditions classifiable elsewhere, antepartum 2011    Placenta previa without hemorrhage, antepartum 2011    High-risk pregnancy 2011    Asthma      Overview Note:     Uses albuterol when necessary. Past Medical History:   Diagnosis Date    Asthma     Uses albuterol when necessary.  Clotting disorder ([facility]     NEISHA-1    Endometriosis     Gum disease     MTHFR mutation ([facility] [address] [facility]     1298    Osteopenia     Pernicious anemia     Premature ovarian failure     Raynaud's disease     Skin mole     precancerous mole removed in 2011    Tachycardia 2014    holter monitor done , no ectopy, hormone induceed ? Family History   Problem Relation Age of Onset    Colon Cancer Paternal Grandfather     Colon Cancer Paternal Grandmother     Heart Disease Paternal Grandmother     Diabetes Maternal Grandfather     Emphysema Maternal Grandfather     Ulcerative Colitis Father     High Cholesterol Mother        Social History     Tobacco Use    Smoking status: Former Smoker     Packs/day: 0.25     Years: 0.50     Pack years: 0.12     Types: Cigarettes     Last attempt to quit: 2000     Years since quittin.8    Smokeless tobacco: Never Used   Substance Use Topics    Alcohol use: No       Current Outpatient Medications   Medication Sig Dispense Refill    gabapentin (NEURONTIN) 100 MG capsule TAKE 1 CAPSULE BY MOUTH EVERYDAY AT BEDTIME  0    cetirizine (ZYRTEC) 10 MG tablet Take 10 mg by mouth daily      buPROPion (WELLBUTRIN) 100 MG tablet Take 100 mg by mouth daily.  Cholecalciferol (VITAMIN D3) 3000 UNITS TABS Take 2,000 Int'l Units by mouth daily.  desvenlafaxine (PRISTIQ) 50 MG TB24 Take 50 mg by mouth daily.  cyanocobalamin 1000 MCG/ML injection Inject 1,000 mcg into the muscle every 30 days.  promethazine (PHENERGAN) 12.5 MG tablet Take 25 mg by mouth as needed.       metoprolol succinate (TOPROL XL) 25 MG extended release tablet Take 1 tablet by mouth daily (Patient not taking: Reported on 2019) 30 tablet 3    LORazepam (ATIVAN) 0.5 MG tablet   0     No current facility-administered medications for this visit. Allergies   Allergen Reactions    Hydroxyprogesterone Shortness Of Breath    Adhesive Tape      blisters and rash    Clindamycin/Lincomycin Hives    Codeine Nausea And Vomiting    Prednisone Nausea And Vomiting     With oral only         ROS:   Constitutional: Negative for fever, activity change and appetite change. HENT: Negative for epistaxis. Eyes: Negative for diploplia, blurred vision. Respiratory: Negative for cough, chest tightness, shortness of breath and wheezing. Cardiovascular: pertinent positives in HPI  Gastrointestinal: Negative for abdominal pain and blood in stool. All other review of systems are negative     PHYSICAL EXAM:   Vitals:    19 1310   BP: 114/78   Pulse: 94   Resp: 16   Weight: 170 lb (77.1 kg)   Height: 5' 8\" (1.727 m)      Constitutional: Well-developed, no acute distress  Eyes: conjunctivae normal, no xanthelasma   Ears, Nose, Throat: oral mucosa moist, no cyanosis   CV: no JVD. Regular rate and rhythm. Normal S1S2 and no S3. No murmurs, rubs, or gallops. PMI is nondisplaced  Lungs: clear to auscultation bilaterally, normal respiratory effort without used of accessory muscles  Abdomen: soft, non-tender, bowel sounds present, no masses or hepatomegaly   Musculoskeletal: no digital clubbing, no edema   Skin: warm, no rashes     I have personally reviewed the laboratory, cardiac diagnostic and radiographic testing as outlined below:    Data:    No results for input(s): WBC, HGB, HCT, PLT in the last 72 hours. No results for input(s): NA, K, CL, CO2, BUN, CREATININE, CALCIUM in the last 72 hours. Invalid input(s): GLU, MAGNESIUM   Lab Results   Component Value Date    MG 2.0 2016     No results for input(s): TSH in the last 72 hours. No results for input(s): INR in the last 72 hours.     EK19: SR, rate: 94bpm , no delta wave - Please see scan in Cardiology. Echocardiogram: 8/16/19:  ECHO COMPLETE   Order: [id number]   Status:  Edited Result - FINAL   Visible to patient:  Yes (Kiara) Next appt:  09/18/2019 at 01:00 PM in Cardiac Electrophysiology (Rob Parsons MD) Dx: Tachycardia   Details     Reading Physician Reading Date Result Priority   Guy Gaines MD 8/16/2019    Unknown Provider Result 8/16/2019       Narrative     Transthoracic Echocardiography Report (TTE)     Demographics      Patient Name       Katina Economy  Gender               Female      Medical Record      [id number]    Room Number   Number      Account #           [de-identified]   Procedure Date       08/16/2019      Corporate ID                    Ordering Physician   Magali Gray MD      Accession Number    [id number]   Referring Physician Lakshmi JONES      Date of Birth       1980  Sonographer         Deangelo Sandoval RUST      Age                 39 year(s)  Interpreting         Thom Haider MD                                   Physician                                      Any Other     Procedure    Type of Study      TTE procedure:Echo Complete W/ Dop & Color Flow.     Procedure Date  Date: 08/16/2019 Start: 07:08 AM    Study Location: Echo Lab  Technical Quality: Adequate visualization    Indications:LV function. Patient Status: Routine    Height: 68 inches Weight: 172 pounds BSA: 1.92 m^2 BMI: 26.15 kg/m^2    BP: 122/84 mmHg     Findings      Left Ventricle   Normal left ventricle size and systolic function.   Ejection fraction is visually estimated at 55-60%.   No regional wall motion abnormalities seen.   Normal left ventricle wall thickness.   Normal diastolic function.      Right Ventricle   Normal right ventricular size and function.  TAPSE 20 mm.      Left Atrium   Left atrium is of normal size.   Interatrial septum appears intact.   No evidence of patent foramen ovale.      Right Atrium   Normal right atrium size.      Mitral Valve   Normal mitral valve structure and function.   No evidence of mitral valve stenosis.   Physiologic and/or trace mitral regurgitation is present.      Tricuspid Valve   The tricuspid valve appears structurally normal.   Mild tricuspid regurgitation.  RVSP is 25 mmHg. Normal PA systolic pressure.      Aortic Valve   Structurally normal aortic valve.   The aortic valve is trileaflet with good leaflet separation.   No hemodynamically significant aortic stenosis is present.      Pulmonic Valve   Pulmonic valve is structurally normal.   Physiologic and/or trace pulmonic regurgitation present.   No evidence of pulmonic valve stenosis.     Pericardial Effusion   No evidence for hemodynamically significant pericardial effusion.      Pleural Effusion   No evidence of pleural effusion.      Aorta   Normal aortic root and ascending aorta.   Miscellaneous   The inferior vena cava diameter is normal with normal respiratory   variation.      Conclusions      Summary   Normal left ventricle size and systolic function.   Ejection fraction is visually estimated at 55-60%.   No regional wall motion abnormalities seen.   Normal left ventricle wall thickness.   Normal diastolic function.   Normal right ventricular size and function. TAPSE 20 mm.   No hemodynamically significant aortic stenosis is present.   Mild tricuspid regurgitation.  RVSP is 25 mmHg.  Normal PA systolic pressure.   No evidence for hemodynamically significant pericardial effusion.   No previous echo for comparison.      Signature      ----------------------------------------------------------------   Electronically signed by Nerissa Grove MD(Interpreting   physician) on 08/16/2019 06:13 PM   ----------------------------------------------------------------     M-Mode/2D Measurements & Calculations      LV Diastolic   LV    m/s   2.3 cm^2               Pulm. Vein S Velocity: 0.56 PV Peak Gradient: 3.44   MV E' Septal Velocity: m/s                         mmHg   0.11 m/s                                           PV Mean Velocity: 0.59   MV E' Lateral                                      m/s   Velocity: 14 m/s                                   PV Mean Gradient: 1.6   MR Velocity: 4.66 m/s                              mmHg   MV ÁNGELA PISA: 0.1 cm^2   MR VTI: 175.6 cm   Alias Velocity: 0.31   m/sPISA Radius: 0.5 cm      PISA area: 1.57 cm^2MR   flow rate: 48.67   ml/sMR volume:17.56 ml             Stress Test: 2014: Outpatient Monitor: 2014:        Tilt Table: 8/12/19:  [facility]) [facility] [address] Electrophysiology  Procedure Report  PATIENT: Alfred Bianchi  MEDICAL RECORD NUMBER: [id number]  DATE OF PROCEDURE:  8/12/2019  ATTENDING ELECTROPHYSIOLOGIST:  Ksenia Park MD  REFERRING PHYSICIAN: Dr. Anahi Guerra     PROCEDURE:    1. Tilt Table Testing     INDICATION:  ZNear syncope     PROCEDURE PERFORMED BY: Ksenia Park MD     PROCEDURE TIME: Thirty minutes     COMPLICATIONS: None immediately apparent     DESCRIPTION OF PROCEDURE: The risks, benefits, and alternatives to the procedure were discussed with the patient, and informed consent was obtained. The patient was brought to the Tilt table lab. Baseline supine blood pressures and heart rates were obtained. The baseline blood pressure was 107/68 mmHg and baseline heart rate is 86 bpm.  After 5 minutes in the supine position, the patient was placed in the 70 degree head-upright position. After approximately 20 minutes no symptoms had occurred and thus, a single 0.4mg sublingual nitroglycerine tablet was given and the 70 degree head-upright position was continued. The max BP recorded was 47/38 mmHg with a heart rate of 60 bpm.  The patient had symptoms of syncope. The patient was placed back down in the supine position.   At the

## 2019-09-18 ENCOUNTER — OFFICE VISIT (OUTPATIENT)
Dept: NON INVASIVE DIAGNOSTICS | Age: 39
End: 2019-09-18
Payer: COMMERCIAL

## 2019-09-18 VITALS
HEIGHT: 68 IN | WEIGHT: 170 LBS | HEART RATE: 94 BPM | SYSTOLIC BLOOD PRESSURE: 114 MMHG | RESPIRATION RATE: 16 BRPM | BODY MASS INDEX: 25.76 KG/M2 | DIASTOLIC BLOOD PRESSURE: 78 MMHG

## 2019-09-18 DIAGNOSIS — R00.0 TACHYCARDIA: Primary | ICD-10-CM

## 2019-09-18 PROCEDURE — 1036F TOBACCO NON-USER: CPT | Performed by: INTERNAL MEDICINE

## 2019-09-18 PROCEDURE — G8427 DOCREV CUR MEDS BY ELIG CLIN: HCPCS | Performed by: INTERNAL MEDICINE

## 2019-09-18 PROCEDURE — 99214 OFFICE O/P EST MOD 30 MIN: CPT | Performed by: INTERNAL MEDICINE

## 2019-09-18 PROCEDURE — G8419 CALC BMI OUT NRM PARAM NOF/U: HCPCS | Performed by: INTERNAL MEDICINE

## 2019-09-18 PROCEDURE — 93000 ELECTROCARDIOGRAM COMPLETE: CPT | Performed by: INTERNAL MEDICINE

## 2019-11-11 RX ORDER — METOPROLOL SUCCINATE 25 MG/1
TABLET, EXTENDED RELEASE ORAL
Qty: 30 TABLET | Refills: 3 | Status: SHIPPED | OUTPATIENT
Start: 2019-11-11 | End: 2020-01-21 | Stop reason: ALTCHOICE

## 2020-01-20 ENCOUNTER — TELEPHONE (OUTPATIENT)
Dept: NON INVASIVE DIAGNOSTICS | Age: 40
End: 2020-01-20

## 2020-01-20 NOTE — TELEPHONE ENCOUNTER
----- Message from Rapheal Aschoff, MD sent at 1/17/2020  4:48 PM EST -----  Can try Cardizem  mg daily. Thanks.  ----- Message -----  From: Maria Victoria Gee  Sent: 1/17/2020   3:17 PM EST  To: Rapheal Aschoff, MD    Patient called in asking if there is another medication she can take in place of the Metoprolol, she feels like she is unable to loose weight with this medication.  Please advise, thanks

## 2020-01-20 NOTE — TELEPHONE ENCOUNTER
LVM stating that CK would recommend trying Cardizem  mg daily, if she would like to switch to contact our office.

## 2020-01-21 RX ORDER — DILTIAZEM HYDROCHLORIDE 120 MG/1
120 CAPSULE, COATED, EXTENDED RELEASE ORAL DAILY
COMMUNITY
End: 2020-01-21 | Stop reason: SDUPTHER

## 2020-01-21 RX ORDER — DILTIAZEM HYDROCHLORIDE 120 MG/1
120 CAPSULE, COATED, EXTENDED RELEASE ORAL DAILY
Qty: 30 CAPSULE | Refills: 11 | Status: SHIPPED
Start: 2020-01-21 | End: 2020-03-05 | Stop reason: ALTCHOICE

## 2020-02-01 ENCOUNTER — HOSPITAL ENCOUNTER (EMERGENCY)
Age: 40
Discharge: HOME | End: 2020-02-01

## 2020-02-01 ENCOUNTER — APPOINTMENT (OUTPATIENT)
Dept: GENERAL RADIOLOGY | Age: 40
End: 2020-02-01

## 2020-02-01 VITALS
BODY MASS INDEX: 24.25 KG/M2 | OXYGEN SATURATION: 100 % | HEART RATE: 93 BPM | SYSTOLIC BLOOD PRESSURE: 115 MMHG | HEIGHT: 68 IN | WEIGHT: 160 LBS | TEMPERATURE: 98 F | RESPIRATION RATE: 16 BRPM | DIASTOLIC BLOOD PRESSURE: 69 MMHG

## 2020-02-01 PROCEDURE — 73110 X-RAY EXAM OF WRIST: CPT

## 2020-02-01 PROCEDURE — 99284 EMERGENCY DEPT VISIT MOD MDM: CPT

## 2020-02-01 PROCEDURE — 29125 APPL SHORT ARM SPLINT STATIC: CPT

## 2020-02-01 RX ORDER — HYDROCODONE BITARTRATE AND ACETAMINOPHEN 5; 325 MG/1; MG/1
1 TABLET ORAL EVERY 6 HOURS PRN
Qty: 12 TABLET | Refills: 0 | Status: SHIPPED | OUTPATIENT
Start: 2020-02-01 | End: 2020-02-04

## 2020-02-01 ASSESSMENT — PAIN DESCRIPTION - PAIN TYPE: TYPE: ACUTE PAIN

## 2020-02-01 ASSESSMENT — PAIN SCALES - GENERAL: PAINLEVEL_OUTOF10: 7

## 2020-02-01 ASSESSMENT — PAIN DESCRIPTION - LOCATION: LOCATION: WRIST

## 2020-02-01 ASSESSMENT — PAIN DESCRIPTION - ORIENTATION: ORIENTATION: LEFT

## 2020-02-01 NOTE — ED NOTES
Bed: 36  Expected date:   Expected time:   Means of arrival:   Comments:  Hold rabies booster      Nikko Suárez RN  02/01/20 1208

## 2020-02-01 NOTE — ED NOTES
Bed: 35  Expected date:   Expected time:   Means of arrival:   Comments:  iek Dahl, JAIDEN  02/01/20 9948

## 2020-02-03 ENCOUNTER — TELEPHONE (OUTPATIENT)
Dept: ORTHOPEDIC SURGERY | Age: 40
End: 2020-02-03

## 2020-02-05 ENCOUNTER — TELEPHONE (OUTPATIENT)
Dept: ORTHOPEDIC SURGERY | Age: 40
End: 2020-02-05

## 2020-03-05 ENCOUNTER — TELEPHONE (OUTPATIENT)
Dept: NON INVASIVE DIAGNOSTICS | Age: 40
End: 2020-03-05

## 2020-03-05 RX ORDER — PROPRANOLOL HYDROCHLORIDE 10 MG/1
10 TABLET ORAL 3 TIMES DAILY
Qty: 90 TABLET | Refills: 5 | Status: CANCELLED | OUTPATIENT
Start: 2020-03-05

## 2020-03-05 RX ORDER — PROPRANOLOL HYDROCHLORIDE 10 MG/1
10 TABLET ORAL 3 TIMES DAILY
COMMUNITY
End: 2020-03-06 | Stop reason: SDUPTHER

## 2020-03-05 NOTE — TELEPHONE ENCOUNTER
I left a message for Tamiko Mcintosh stating Dr. Moustapha Davila was going to prescribe Inderal 10mg tid.

## 2020-03-06 ENCOUNTER — TELEPHONE (OUTPATIENT)
Dept: NON INVASIVE DIAGNOSTICS | Age: 40
End: 2020-03-06

## 2020-03-06 RX ORDER — PROPRANOLOL HYDROCHLORIDE 10 MG/1
10 TABLET ORAL 2 TIMES DAILY
Qty: 60 TABLET | Refills: 5 | Status: SHIPPED
Start: 2020-03-06 | End: 2020-03-26 | Stop reason: ALTCHOICE

## 2020-03-06 NOTE — TELEPHONE ENCOUNTER
Patient does not want to take a medication 3 times a day. Could you please recommend something different?

## 2020-03-26 ENCOUNTER — TELEPHONE (OUTPATIENT)
Dept: NON INVASIVE DIAGNOSTICS | Age: 40
End: 2020-03-26

## 2020-03-26 RX ORDER — METOPROLOL SUCCINATE 25 MG/1
25 TABLET, EXTENDED RELEASE ORAL DAILY
Qty: 30 TABLET | Refills: 11 | Status: SHIPPED
Start: 2020-03-26 | End: 2021-02-16

## 2020-03-26 RX ORDER — METOPROLOL SUCCINATE 25 MG/1
25 TABLET, EXTENDED RELEASE ORAL DAILY
COMMUNITY
End: 2020-03-26 | Stop reason: SDUPTHER

## 2020-03-26 NOTE — TELEPHONE ENCOUNTER
From: Pola Daiz  Sent: 1/17/2020   3:17 PM EST  To: Anisha Perdue MD     Patient called in asking if there is another medication she can take in place of the Metoprolol, she feels like she is unable to loose weight with this medication. Please advise, thanks     She was on Toprol XL before as above and said that she was unable to loose weight so we switched to Cardizem which she said it did not help then we switched her to Inderal. We can go back to Metoprolol ER again but it is the same as Toprol XL. Thanks.

## 2020-03-26 NOTE — TELEPHONE ENCOUNTER
Phone call from the patient. She wishes to be started on \"metoprolol ER 25 mg twice daily\" to try to get better relief of her symptoms. Is this an option for her?

## 2020-11-03 ENCOUNTER — TELEPHONE (OUTPATIENT)
Dept: NON INVASIVE DIAGNOSTICS | Age: 40
End: 2020-11-03

## 2020-11-03 ENCOUNTER — NURSE ONLY (OUTPATIENT)
Dept: NON INVASIVE DIAGNOSTICS | Age: 40
End: 2020-11-03

## 2020-11-03 VITALS — TEMPERATURE: 98 F

## 2020-11-03 RX ORDER — IVABRADINE 5 MG/1
5 TABLET, FILM COATED ORAL 2 TIMES DAILY WITH MEALS
Qty: 60 TABLET | Refills: 11 | Status: SHIPPED
Start: 2020-11-03 | End: 2022-07-06

## 2020-11-03 RX ORDER — IVABRADINE 5 MG/1
5 TABLET, FILM COATED ORAL 2 TIMES DAILY WITH MEALS
COMMUNITY
End: 2020-11-03 | Stop reason: SDUPTHER

## 2020-11-03 NOTE — TELEPHONE ENCOUNTER
We have not seen that her heart rate dropped that low on 30 day monitor in 9/2019. The lowest was 60's. Will repeat 30 day monitor now. Have she ever try on Corlanor? Thanks.

## 2020-11-03 NOTE — TELEPHONE ENCOUNTER
Patient calling and states her HR is spiking quickly up to 160bpm and then dropping suddenly down to 40's. She instantly becomes nausea and these episodes are happening every other day. She is still taking metoprolol 25mg daily. Please advise.

## 2020-11-10 ENCOUNTER — NURSE ONLY (OUTPATIENT)
Dept: NON INVASIVE DIAGNOSTICS | Age: 40
End: 2020-11-10

## 2020-12-16 ENCOUNTER — TELEPHONE (OUTPATIENT)
Dept: NON INVASIVE DIAGNOSTICS | Age: 40
End: 2020-12-16

## 2020-12-16 NOTE — TELEPHONE ENCOUNTER
----- Message from Zachariah Mosquera MD sent at 12/15/2020  5:15 PM EST -----  Please let her know that 30 day monitor showed no arrhythmias. Symptoms correlated with sinus rhythm and sinus tachycardia. Skipped beats correlated with PACs and PVCs. Thanks.  ----- Message -----  From: Urvashi Keller  Sent: 12/15/2020  11:07 AM EST  To: Zachariah Mosquera MD    Patient calling in for results, please advise.  Thank you

## 2020-12-16 NOTE — TELEPHONE ENCOUNTER
Spoke with patient let her know monitor showed symptoms that correlated with sinus rhythm/sinus tachycardia. Monitor also showed PACs and PVCs. Patient verbalized understanding.

## 2021-02-16 RX ORDER — METOPROLOL SUCCINATE 25 MG/1
TABLET, EXTENDED RELEASE ORAL
Qty: 30 TABLET | Refills: 11 | Status: SHIPPED
Start: 2021-02-16 | End: 2022-02-18

## 2022-02-18 RX ORDER — METOPROLOL SUCCINATE 25 MG/1
25 TABLET, EXTENDED RELEASE ORAL DAILY
Qty: 30 TABLET | Refills: 2 | Status: SHIPPED
Start: 2022-02-18 | End: 2022-05-18

## 2022-05-18 RX ORDER — METOPROLOL SUCCINATE 25 MG/1
TABLET, EXTENDED RELEASE ORAL
Qty: 90 TABLET | Refills: 0 | Status: SHIPPED
Start: 2022-05-18 | End: 2022-08-17

## 2022-07-05 NOTE — PROGRESS NOTES
[address] [facility]  [facility]  PATIENT: Amada Hartman  MEDICAL RECORD NUMBER: [id number]  DATE OF SERVICE:  7/6/2022  ATTENDING ELECTROPHYSIOLOGIST: Claire Villagomez MD  REFERRING PHYSICIAN:  Eleanor Blizzard, MD  CHIEF COMPLAINT: Palpitations    HPI: This is a 38 y.o. female with a history of   Patient Active Problem List   Diagnosis    Pregnancy with history of pre-term labor    Other current maternal conditions classifiable elsewhere, antepartum    Placenta previa without hemorrhage, antepartum    High-risk pregnancy    Asthma    Other procreative management counseling and advice    Personal history of other genital system and obstetric disorders(V13.29)    Primary hypercoagulable state ([address]    Acute appendicitis    Dizziness    Vasovagal syncope   who presents to cardiac electrophysiology clinic for follow up. Since her last office visit Ms. Benjamin underwent 30 day cardiac monitor which showed no arrhythmias. Symptoms were correlated with sinus rhythm, sinus tachycardia, PACS and PVCs. She was started on Corlanor but did not taking it. She continues on Toprol XL. She reports feeling great up until the last month. She had a episode when she \"felt her heart stop and she had to catch her breath\". She reports having almost daily episodes with these symptoms. She goes on to report not being able to wear a 30 day monitor due to having a \"reaction to the [facility]". She presents today in SR. The patient denies any chest pain, dyspnea, syncope, orthopnea or paroxysmal nocturnal dyspnea.      Patient Active Problem List    Diagnosis Date Noted    Dizziness     Vasovagal syncope     Acute appendicitis 06/08/2019    Other procreative management counseling and advice 08/15/2014    Personal history of other genital system and obstetric disorders(V13.29) 08/15/2014    Primary hypercoagulable state ([address] 08/15/2014    Pregnancy with history of pre-term labor 2011    Other current maternal conditions classifiable elsewhere, antepartum 2011    Placenta previa without hemorrhage, antepartum 2011    High-risk pregnancy 2011    Asthma      Overview Note:     Uses albuterol when necessary. Past Medical History:   Diagnosis Date    Asthma     Uses albuterol when necessary.  Clotting disorder (HCC)     NEISHA-1    Endometriosis     Gum disease     MTHFR mutation     1298    Osteopenia     Pernicious anemia     Premature ovarian failure     Raynaud's disease     Skin mole     precancerous mole removed in 2011    Tachycardia 2014    holter monitor done , no ectopy, hormone induceed ? Family History   Problem Relation Age of Onset    Colon Cancer Paternal Grandfather     Colon Cancer Paternal Grandmother     Heart Disease Paternal Grandmother     Diabetes Maternal Grandfather     Emphysema Maternal Grandfather     Ulcerative Colitis Father     High Cholesterol Mother        Social History     Tobacco Use    Smoking status: Former Smoker     Packs/day: 0.25     Years: 0.50     Pack years: 0.12     Types: Cigarettes     Quit date: 2000     Years since quittin.6    Smokeless tobacco: Never Used   Substance Use Topics    Alcohol use: No       Current Outpatient Medications   Medication Sig Dispense Refill    estradiol (CLIMARA) 0.1 MG/24HR APPLY 1 PATCH AS DIRECTED ONE TIME A WEEK. TO ABDOMEN OR BUTTOCKS      metoprolol succinate (TOPROL XL) 25 MG extended release tablet TAKE 1 TABLET BY MOUTH EVERY DAY PATIENT NEEDS TO CALL TO MAKE AN APPOINTMENT FOR FURTHER REFILLS 90 tablet 0    buPROPion (WELLBUTRIN) 100 MG tablet Take 300 mg by mouth daily       LORazepam (ATIVAN) 0.5 MG tablet Take 0.5 mg by mouth every 8 hours as needed. 0    desvenlafaxine (PRISTIQ) 50 MG TB24 Take 150 mg by mouth daily       promethazine (PHENERGAN) 12.5 MG tablet Take 25 mg by mouth as needed.        No current facility-administered medications for this visit. Allergies   Allergen Reactions    Hydroxyprogesterone Shortness Of Breath    Adhesive Tape      blisters and rash    Clindamycin/Lincomycin Hives    Codeine Nausea And Vomiting    Prednisone Nausea And Vomiting     With oral only       ROS:   Constitutional: Negative for fever, activity change and appetite change. HENT: Negative for epistaxis. Eyes: Negative for diploplia, blurred vision. Respiratory: Negative for cough, chest tightness, shortness of breath and wheezing. Cardiovascular: pertinent positives in HPI  Gastrointestinal: Negative for abdominal pain and blood in stool. All other review of systems are negative     PHYSICAL EXAM:   Vitals:    22 0934   BP: 112/76   Pulse: 84   Resp: 16   Weight: 190 lb 6.4 oz (86.4 kg)   Height: 5' 8\" (1.727 m)      Constitutional: Well-developed, no acute distress  Eyes: conjunctivae normal, no xanthelasma   Ears, Nose, Throat: oral mucosa moist, no cyanosis   CV: no JVD. Regular rate and rhythm. Normal S1S2 and no S3. No murmurs, rubs, or gallops. PMI is nondisplaced  Lungs: clear to auscultation bilaterally, normal respiratory effort without used of accessory muscles  Abdomen: soft, non-tender, bowel sounds present, no masses or hepatomegaly   Musculoskeletal: no digital clubbing, no edema   Skin: warm, no rashes     I have personally reviewed the laboratory, cardiac diagnostic and radiographic testing as outlined below:    Data:    No results for input(s): WBC, HGB, HCT, PLT in the last 72 hours. No results for input(s): NA, K, CL, CO2, BUN, CREATININE, GLU, CALCIUM in the last 72 hours. Invalid input(s): MAGNESIUM   Lab Results   Component Value Date/Time    MG 2.0 2016 09:00 AM     No results for input(s): TSH in the last 72 hours. No results for input(s): INR in the last 72 hours.     EK22: SR, rate: 84 bpm ,  no delta wave - Please see scan in intact.   No evidence of patent foramen ovale.      Right Atrium   Normal right atrium size.      Mitral Valve   Normal mitral valve structure and function.   No evidence of mitral valve stenosis.   Physiologic and/or trace mitral regurgitation is present.      Tricuspid Valve   The tricuspid valve appears structurally normal.   Mild tricuspid regurgitation.  RVSP is 25 mmHg. Normal PA systolic pressure.      Aortic Valve   Structurally normal aortic valve.   The aortic valve is trileaflet with good leaflet separation.   No hemodynamically significant aortic stenosis is present.      Pulmonic Valve   Pulmonic valve is structurally normal.   Physiologic and/or trace pulmonic regurgitation present.   No evidence of pulmonic valve stenosis.     Pericardial Effusion   No evidence for hemodynamically significant pericardial effusion.      Pleural Effusion   No evidence of pleural effusion.      Aorta   Normal aortic root and ascending aorta.   Miscellaneous   The inferior vena cava diameter is normal with normal respiratory   variation.      Conclusions      Summary   Normal left ventricle size and systolic function.   Ejection fraction is visually estimated at 55-60%.   No regional wall motion abnormalities seen.   Normal left ventricle wall thickness.   Normal diastolic function.   Normal right ventricular size and function. TAPSE 20 mm.   No hemodynamically significant aortic stenosis is present.   Mild tricuspid regurgitation.  RVSP is 25 mmHg.  Normal PA systolic pressure.   No evidence for hemodynamically significant pericardial effusion.   No previous echo for comparison.      Signature      ----------------------------------------------------------------   Electronically signed by Destin Elizalde MD(Interpreting   physician) on 08/16/2019 06:13 PM   ----------------------------------------------------------------     M-Mode/2D Measurements & Calculations      LV Diastolic   LV Systolic Dimension: 3.6   AV Cusp Separation: 2 cmLA   Dimension: 5   cm                           Dimension: 2.9 cmAO Root   cm             LV Volume Diastolic: 295.5   Dimension: 2.3 cm   LV FS:28 %     ml   LV PW          LV Volume Systolic: 55 ml   Diastolic: 0.6 LV EDV/LV EDV Index: 118.8   cm             ml/62 ml/m^2LV ESV/LV ESV    RV Diastolic Dimension: 2.3 cm   LV PW          Index: 55 EO/98YZ/ m^2   Systolic: 0.9  EF Calculated: 53.7 %        LA/Aorta: 1.39   cm             LV Mass Index: 49 l/min*m^2  Ascending Aorta: 2.3 cm   Septum                                      LA volume/Index: 44.4 ml   Diastolic: 0.6                              /25ml/m^2   cm             LVOT: 1.9 cm                 RA Area: 13.2 cm^2   Septum   Systolic: 0.9                               IVC Expiration: 1.5 cm   cm      LV Mass: 94.89   g     Doppler Measurements & Calculations      MV Peak E-Wave: 1.02   AV Peak Velocity: 1.33 m/s  LVOT Peak Velocity:   m/s                    AV Peak Gradient: 7.08 mmHg 1.06 m/s   MV Peak A-Wave: 0.83   AV Mean Velocity: 0.87 m/s  LVOT Mean Velocity:   m/s                    AV Mean Gradient: 3.5 mmHg  0.65 m/s   MV E/A Ratio: 1.23     AV VTI: 24.4 cm             LVOT Peak Gradient: 4.5   MV Peak Gradient: 3.9  AV Area (Continuity):2.23   mmHgLVOT Mean Gradient:   mmHg                   cm^2                        2 mmHg   MV Mean Gradient: 1.9                              Estimated RVSP: 24.8   mmHg                   LVOT VTI: 19.2 cm           mmHg   MV Mean Velocity: 0.65 IVRT: 79.6 msec             Estimated RAP:3 mmHg   m/s                    Estimated PASP: 24.79 mmHg   MV Deceleration Time:  Pulm. Vein A Reversal   179.3 msec             Duration:129.2 msec         TR Velocity:2.33 m/s   MV P1/2t: 61.2 msec    Pulm. Vein D Velocity:0.48  TR Gradient:21.79 mmHg   MVA by PHT:3.59 cm^2   m/sPulm.  Vein A Reversal    PV Peak Velocity: 0.93   MV Area (continuity):  Velocity:0.3 m/s            m/s   2.3 cm^2               Pulm. Vein S Velocity: 0.56 PV Peak Gradient: 3.44   MV E' Septal Velocity: m/s                         mmHg   0.11 m/s                                           PV Mean Velocity: 0.59   MV E' Lateral                                      m/s   Velocity: 14 m/s                                   PV Mean Gradient: 1.6   MR Velocity: 4.66 m/s                              mmHg   MV ÁNGELA PISA: 0.1 cm^2   MR VTI: 175.6 cm   Alias Velocity: 0.31   m/sPISA Radius: 0.5 cm      PISA area: 1.57 cm^2MR   flow rate: 48.67   ml/sMR volume:17.56 ml             Stress Test: 2014: Outpatient Monitor: 2014:        Tilt Table: 8/12/19:  [facility]) Physicians- The [facility] and [address] [facility]  Procedure Report  PATIENT: Jorge Walters  MEDICAL RECORD NUMBER: [id number]  DATE OF PROCEDURE:  8/12/2019  ATTENDING ELECTROPHYSIOLOGIST:  Maki Driscoll MD  REFERRING PHYSICIAN: Dr. Denise Caraballo     PROCEDURE:    1. Tilt Table Testing     INDICATION:  Near syncope     PROCEDURE PERFORMED BY: Maki Driscoll MD     PROCEDURE TIME: Thirty minutes     COMPLICATIONS: None immediately apparent     DESCRIPTION OF PROCEDURE: The risks, benefits, and alternatives to the procedure were discussed with the patient, and informed consent was obtained. The patient was brought to the Tilt table lab. Baseline supine blood pressures and heart rates were obtained. The baseline blood pressure was 107/68 mmHg and baseline heart rate is 86 bpm.  After 5 minutes in the supine position, the patient was placed in the 70 degree head-upright position. After approximately 20 minutes no symptoms had occurred and thus, a single 0.4mg sublingual nitroglycerine tablet was given and the 70 degree head-upright position was continued. The max BP recorded was 47/38 mmHg with a heart rate of 60 bpm.  The patient had symptoms of syncope. The patient was placed back down in the supine position.   At the conclusion of tilt table testing, the patient's blood pressure and heart rate were back at baseline.     SUMMARY:  1. Positive tilt table test for Neurocardiogenic syncope.     RECOMMENDATIONS:  1. Maintain adequate hydration. Drink approximately 2-2.5 L of non-caffeinated beverage/ per day. 2. Limit caffeine and alcohol use. 3. Liberalize salt intake. 4. If prescribed, please wear the support stockings or compression socks as instructed. 5. If medication is prescribed, please take it as instructed.     Tony Bell MD  Cardiac Electrophysiology  [facility]) Physicians  [facility]: [place] [facility]  7:41 AM  8/12/2019    30 day MCOT: August 2019:   Normal sinus rhythm. Symptoms correlated with sinus rhythm, sinus tachycardia and PACs as well as atrial arrhythmias of 4 and 5 beats in duration. I have independently reviewed all of the ECGs and rhythm strips per above     Assessment/Plan: This is a 38 y.o. female with a history of   Patient Active Problem List   Diagnosis    Pregnancy with history of pre-term labor    Other current maternal conditions classifiable elsewhere, antepartum    Placenta previa without hemorrhage, antepartum    High-risk pregnancy    Asthma    Other procreative management counseling and advice    Personal history of other genital system and obstetric disorders(V13.29)    [address] hyperco[address]able state [address]    Acute appendicitis    Dizziness    Vasovagal syncope     1. Palpitations  - Probable from extrasystoles. - Thirty day MCOT 9/9/19 showed symptoms correlated with sinus rhythm, sinus tachycardia and PACs as well as atrial arrhythmias of 4 and 5 beats in duration.  - Thirty day cardiac monitor 12/8/20 showed no arrhythmias. Symptoms were correlated with sinus rhythm, sinus tachycardia, PACS and PVCs. - On Toprol XL.     2. Dizziness  - Positive TTT  8/12/19 for neurocardiogenic syncope   - Advised life style modifications as below     - Make all postural changes from lying to sitting or sitting to standing slowly. - Drink to 2.0 -2.5 L of fluids per day. - Increase sodium in the diet to 3 - 5 g per day. - Avoid large meals which can cause low blood pressure during digestion.     - Avoid alcohol and excessive caffeine intake. - Perform lower extremity exercises to improve strength of the leg muscles. - Use physical counter maneuvers such as leg crossing, or leg raising and resting the leg on a chair.      - Raise the head of the bed by 6 to 10 inches. - Use custom fitted elastic support stockings. 3. Asthma    Recommendations:  1. She is willing to try a new cardiac monitor patch. Will obtain a 14 day Zio XT to try and correlate symptoms with arrhythmias. 2. Continue Toprol XL 25 mg daily. 3. Lifestyle modifications as above. 4. Follow up in 6 months. Encouraged the patient to call the office for any questions or concerns. I have spent a total of 40 minutes with the patient and the family reviewing the above stated recommendations. And a total of >50% of that time involved face-to-face time providing counseling and/or coordination of care with the other providers, preparation for the clinic visit, reviewing records/tests, counseling/education of the patient, ordering medications/tests/procedures, coordinating care, and documenting clinical information in the EHR. Thank you for allowing me to participate in your patient's care. Please call me if there are any questions or concerns.       Jaret Harman MD  Cardiac Electrophysiology  [facility]  The [facility] and Vascular [place]: [place] [facility]  7/6/22   9:42 AM

## 2022-07-06 ENCOUNTER — OFFICE VISIT (OUTPATIENT)
Dept: NON INVASIVE DIAGNOSTICS | Age: 42
End: 2022-07-06
Payer: COMMERCIAL

## 2022-07-06 VITALS
BODY MASS INDEX: 28.85 KG/M2 | HEIGHT: 68 IN | RESPIRATION RATE: 16 BRPM | DIASTOLIC BLOOD PRESSURE: 76 MMHG | WEIGHT: 190.4 LBS | SYSTOLIC BLOOD PRESSURE: 112 MMHG | HEART RATE: 84 BPM

## 2022-07-06 DIAGNOSIS — R00.0 TACHYCARDIA: Primary | ICD-10-CM

## 2022-07-06 PROCEDURE — 93000 ELECTROCARDIOGRAM COMPLETE: CPT | Performed by: INTERNAL MEDICINE

## 2022-07-06 PROCEDURE — 1036F TOBACCO NON-USER: CPT | Performed by: INTERNAL MEDICINE

## 2022-07-06 PROCEDURE — G8427 DOCREV CUR MEDS BY ELIG CLIN: HCPCS | Performed by: INTERNAL MEDICINE

## 2022-07-06 PROCEDURE — G8419 CALC BMI OUT NRM PARAM NOF/U: HCPCS | Performed by: INTERNAL MEDICINE

## 2022-07-06 PROCEDURE — 99215 OFFICE O/P EST HI 40 MIN: CPT | Performed by: INTERNAL MEDICINE

## 2022-08-17 RX ORDER — METOPROLOL SUCCINATE 25 MG/1
TABLET, EXTENDED RELEASE ORAL
Qty: 90 TABLET | Refills: 3 | Status: SHIPPED
Start: 2022-08-17 | End: 2022-10-24 | Stop reason: SDUPTHER

## 2022-08-25 ENCOUNTER — TELEPHONE (OUTPATIENT)
Dept: NON INVASIVE DIAGNOSTICS | Age: 42
End: 2022-08-25

## 2022-10-24 ENCOUNTER — TELEPHONE (OUTPATIENT)
Dept: NON INVASIVE DIAGNOSTICS | Age: 42
End: 2022-10-24

## 2022-10-24 DIAGNOSIS — R00.0 TACHYCARDIA: ICD-10-CM

## 2022-10-24 RX ORDER — METOPROLOL SUCCINATE 25 MG/1
TABLET, EXTENDED RELEASE ORAL
Qty: 180 TABLET | Refills: 3 | Status: SHIPPED | OUTPATIENT
Start: 2022-10-24

## 2022-10-24 NOTE — TELEPHONE ENCOUNTER
Spoke with patient let her know results and recommendations. She verbalized understanding and new script sent to pharmacy.

## 2022-10-24 NOTE — TELEPHONE ENCOUNTER
----- Message from Christine Thomas MD sent at 10/24/2022 10:05 AM EDT -----  Cardiac monitor showed no significant arrhythmias other than PACs, PVCs and sinus tachycardia. Can try to increase Toprol XL to 25 mg bid.  Thanks.  ----- Message -----  From: Epi Robertson MA  Sent: 10/24/2022   9:48 AM EDT  To: Christine Thomas MD

## 2023-02-28 ENCOUNTER — PATIENT MESSAGE (OUTPATIENT)
Dept: NON INVASIVE DIAGNOSTICS | Age: 43
End: 2023-02-28

## 2023-02-28 NOTE — TELEPHONE ENCOUNTER
From: Supryia Bishop  To: Dr. Thai Esposito: 2/28/2023 7:12 AM EST  Subject: Question    I am not sure if I need to make an appointment or not for this but I had a question. I have not been feeling well the last few months and my symptoms have been progressively worse. Ive been dizzy, lightheaded, nausea, sometimes the feeling of blood pooling in legs, extreme fatigue and have noticed my heart rate is now a lot lower than it has been in years. Its ranged from . My blood oxygen level has seemed to drop as well. I still have the feeling of my heart skipping a beat every once in awhile and I have also had a weird feeling of impending doom. I have no other way to describe it. Is this something that you believe I should be seeing you for? If so I know you had mentioned the possibility of a loop recorder since I can never wear the other monitors very long. I was not sure if you felt that would be warranted at this point.

## 2023-02-28 NOTE — TELEPHONE ENCOUNTER
[place] message reviewed. Called patient at home. She has been feeling \"off\" lately, not sure specific complaints. She states that recently when cleaning house, feels impending doom. As if she should write a letter to her kids, and that she will pass away. She denies any chest pain or SOBOE. Some collar bone left side, pressure lasted 5 seconds at rest.     Does feel lightheaded at times, occurs when lying down. No syncope or blacking out- In the past 2 weeks. Noted hx of clotting disorder  on file-  Seen hematology in the past- was on coumadin. Then went to [facility] and took her off. Vascular at [facility] and she was told not to take it. Mother at age 36 had DVT and PE- unclear cause and unclear clotting disease, did not have factor V. Patient denies any leg pain or CP. Offered monitor to wear from an EP standpoint and she has wore in the past with a lot of skin irritation. She requests linq if we would like to monitor rhythms. She does have sx of lightheaded and near syncope while lying down. Possible dx of POTS In past with Tilt. Strongly advised patient to see PCP for above complaints of \"impending doom\" and go to the ED with any chest pain, SOB, near syncope or other symptoms that are abnormal for her. Will discuss ILR implant with Dr Nick Hong. Recommend hematology referral also due to confusion on [address] recommendations.       Irina Thomas, APRN - CNP

## 2023-03-02 ENCOUNTER — TELEPHONE (OUTPATIENT)
Dept: NON INVASIVE DIAGNOSTICS | Age: 43
End: 2023-03-02

## 2023-03-02 NOTE — TELEPHONE ENCOUNTER
----- Message from Alicia Hoffman sent at 3/1/2023  5:43 PM EST -----  Regarding: Question  After speaking with the NP yesterday I did call my PCP but he is out of town. I had a scan of both legs that came back fine. He mentioned I should really get some bloodwork and should also make sure its not cardiac related. Is that something you guys can do a bloodwork work up for?

## 2023-03-15 ENCOUNTER — TELEPHONE (OUTPATIENT)
Dept: NON INVASIVE DIAGNOSTICS | Age: 43
End: 2023-03-15

## 2023-03-15 NOTE — TELEPHONE ENCOUNTER
Patient called to postpone ILR implant scheduled 03/16/2023 due to changing a medication first to see if symptoms approve. Patient will call our office if she decides to proceed with implant in the future.      Electronically signed by Destini Paul MA on 3/15/2023 at 8:14 AM

## 2023-11-03 RX ORDER — METOPROLOL SUCCINATE 25 MG/1
TABLET, EXTENDED RELEASE ORAL
Qty: 90 TABLET | Refills: 3 | Status: SHIPPED | OUTPATIENT
Start: 2023-11-03

## 2023-11-30 ENCOUNTER — HOSPITAL ENCOUNTER (EMERGENCY)
Age: 43
Discharge: HOME | End: 2023-11-30
Attending: EMERGENCY MEDICINE
Payer: COMMERCIAL

## 2023-11-30 VITALS
OXYGEN SATURATION: 97 % | DIASTOLIC BLOOD PRESSURE: 69 MMHG | SYSTOLIC BLOOD PRESSURE: 118 MMHG | TEMPERATURE: 98.1 F | HEART RATE: 83 BPM | RESPIRATION RATE: 18 BRPM

## 2023-11-30 DIAGNOSIS — H92.02 OTALGIA OF LEFT EAR: ICD-10-CM

## 2023-11-30 DIAGNOSIS — H65.00 ACUTE SEROUS OTITIS MEDIA, RECURRENCE NOT SPECIFIED, UNSPECIFIED LATERALITY: Primary | ICD-10-CM

## 2023-11-30 PROCEDURE — 99283 EMERGENCY DEPT VISIT LOW MDM: CPT

## 2023-11-30 PROCEDURE — 6370000000 HC RX 637 (ALT 250 FOR IP): Performed by: EMERGENCY MEDICINE

## 2023-11-30 RX ORDER — CEFDINIR 300 MG/1
300 CAPSULE ORAL EVERY 12 HOURS SCHEDULED
Status: DISCONTINUED | OUTPATIENT
Start: 2023-11-30 | End: 2023-11-30

## 2023-11-30 RX ORDER — CEFDINIR 300 MG/1
300 CAPSULE ORAL ONCE
Status: COMPLETED | OUTPATIENT
Start: 2023-11-30 | End: 2023-11-30

## 2023-11-30 RX ORDER — IBUPROFEN 600 MG/1
600 TABLET ORAL ONCE
Status: COMPLETED | OUTPATIENT
Start: 2023-11-30 | End: 2023-11-30

## 2023-11-30 RX ORDER — LORATADINE AND PSEUDOEPHEDRINE SULFATE 5; 120 MG/1; MG/1
1 TABLET, EXTENDED RELEASE ORAL 2 TIMES DAILY
Qty: 10 TABLET | Refills: 0 | Status: SHIPPED | OUTPATIENT
Start: 2023-11-30

## 2023-11-30 RX ORDER — HYDROCODONE BITARTRATE AND ACETAMINOPHEN 5; 325 MG/1; MG/1
1 TABLET ORAL EVERY 6 HOURS PRN
Qty: 10 TABLET | Refills: 0 | Status: SHIPPED | OUTPATIENT
Start: 2023-11-30 | End: 2023-12-03

## 2023-11-30 RX ORDER — CEFDINIR 300 MG/1
300 CAPSULE ORAL 2 TIMES DAILY
Qty: 14 CAPSULE | Refills: 0 | Status: SHIPPED | OUTPATIENT
Start: 2023-11-30 | End: 2023-12-07

## 2023-11-30 RX ADMIN — CEFDINIR 300 MG: 300 CAPSULE ORAL at 04:34

## 2023-11-30 RX ADMIN — IBUPROFEN 600 MG: 600 TABLET ORAL at 04:34

## 2023-11-30 ASSESSMENT — PAIN DESCRIPTION - ORIENTATION: ORIENTATION: LEFT

## 2023-11-30 ASSESSMENT — PAIN DESCRIPTION - LOCATION: LOCATION: EAR

## 2024-06-08 ENCOUNTER — HOSPITAL ENCOUNTER (OUTPATIENT)
Dept: GENERAL RADIOLOGY | Age: 44
End: 2024-06-08
Payer: COMMERCIAL

## 2024-06-08 VITALS — HEIGHT: 68 IN | BODY MASS INDEX: 25.46 KG/M2 | WEIGHT: 168 LBS

## 2024-06-08 DIAGNOSIS — Z12.31 VISIT FOR SCREENING MAMMOGRAM: ICD-10-CM

## 2024-06-08 PROCEDURE — 77063 BREAST TOMOSYNTHESIS BI: CPT

## 2024-06-28 ENCOUNTER — CLINICAL DOCUMENTATION (OUTPATIENT)
Dept: GENERAL RADIOLOGY | Age: 44
End: 2024-06-28

## 2024-06-28 NOTE — PROGRESS NOTES
Mammogram clinical report provided to patient. Report sent to Dr. Farhat Asif as per patient's request.

## 2024-08-26 ENCOUNTER — OFFICE VISIT (OUTPATIENT)
Dept: FAMILY MEDICINE CLINIC | Age: 44
End: 2024-08-26

## 2024-08-26 VITALS
HEIGHT: 68 IN | BODY MASS INDEX: 25.64 KG/M2 | WEIGHT: 169.2 LBS | TEMPERATURE: 98.6 F | RESPIRATION RATE: 16 BRPM | HEART RATE: 82 BPM | OXYGEN SATURATION: 98 % | DIASTOLIC BLOOD PRESSURE: 74 MMHG | SYSTOLIC BLOOD PRESSURE: 112 MMHG

## 2024-08-26 DIAGNOSIS — L30.9 DERMATITIS: Primary | ICD-10-CM

## 2024-08-26 RX ORDER — TRIAMCINOLONE ACETONIDE 40 MG/ML
40 INJECTION, SUSPENSION INTRA-ARTICULAR; INTRAMUSCULAR ONCE
Status: COMPLETED | OUTPATIENT
Start: 2024-08-26 | End: 2024-08-26

## 2024-08-26 RX ADMIN — TRIAMCINOLONE ACETONIDE 40 MG: 40 INJECTION, SUSPENSION INTRA-ARTICULAR; INTRAMUSCULAR at 09:50

## 2024-08-26 SDOH — ECONOMIC STABILITY: FOOD INSECURITY: WITHIN THE PAST 12 MONTHS, YOU WORRIED THAT YOUR FOOD WOULD RUN OUT BEFORE YOU GOT MONEY TO BUY MORE.: NEVER TRUE

## 2024-08-26 SDOH — ECONOMIC STABILITY: FOOD INSECURITY: WITHIN THE PAST 12 MONTHS, THE FOOD YOU BOUGHT JUST DIDN'T LAST AND YOU DIDN'T HAVE MONEY TO GET MORE.: NEVER TRUE

## 2024-08-26 SDOH — ECONOMIC STABILITY: INCOME INSECURITY: HOW HARD IS IT FOR YOU TO PAY FOR THE VERY BASICS LIKE FOOD, HOUSING, MEDICAL CARE, AND HEATING?: NOT HARD AT ALL

## 2024-08-26 ASSESSMENT — PATIENT HEALTH QUESTIONNAIRE - PHQ9
SUM OF ALL RESPONSES TO PHQ9 QUESTIONS 1 & 2: 0
SUM OF ALL RESPONSES TO PHQ QUESTIONS 1-9: 0
2. FEELING DOWN, DEPRESSED OR HOPELESS: NOT AT ALL
SUM OF ALL RESPONSES TO PHQ QUESTIONS 1-9: 0
1. LITTLE INTEREST OR PLEASURE IN DOING THINGS: NOT AT ALL

## 2024-08-26 NOTE — PROGRESS NOTES
24  Julia Benjamin : 1980 Sex: female  Age 44 y.o.    Subjective:  Chief Complaint   Patient presents with    Poison Gladis     Legs, back       HPI:   Julia Benjamin , 44 y.o. female presents to the clinic for evaluation of rash to legs and back x 4-5 days. The patient reports associated pruritus. The patient has applied old Triamolone cream for symptoms. The patient reports worsening symptoms over time. The patient reports symptoms developed after clean brush up with poison ivy. Denies any other known cause for the rash including any new soaps, detergents, lotions, foods, or medications. Denies recent travel, recent illness, and ill exposure. Denies any bleeding, drainage, lymphangitic streaking, arthralgia, myalgia,or lethargy.The patient also denies headache, fever, chest pain, abdominal pain, shortness of breath, and nausea / vomiting / diarrhea.    ROS:   Unless otherwise stated in this report the patient's positive and negative responses for review of systems for constitutional, eyes, ENT, cardiovascular, respiratory, gastrointestinal, neurological, , musculoskeletal, and integument systems and related systems to the presenting problem are either stated in the history of present illness or were not pertinent or were negative for the symptoms and/or complaints related to the presenting medical problem.  Positives and pertinent negatives as per HPI.  All others reviewed and are negative.      PMH:     Past Medical History:   Diagnosis Date    Asthma     Uses albuterol when necessary.    BRCA1 negative     BRCA2 negative     Clotting disorder (HCC)     NEISHA-1    Endometriosis     Gum disease 2011    MTHFR mutation     1298    Osteopenia     Pernicious anemia     Premature ovarian failure     Raynaud's disease     Skin mole     precancerous mole removed in 2011    Tachycardia 2014    holter monitor done , no ectopy, hormone induceed ?       Past Surgical History:   Procedure Laterality Date

## 2024-09-03 RX ORDER — METOPROLOL SUCCINATE 25 MG/1
TABLET, EXTENDED RELEASE ORAL
Qty: 90 TABLET | Refills: 3 | Status: SHIPPED | OUTPATIENT
Start: 2024-09-03

## 2024-10-18 ENCOUNTER — HOSPITAL ENCOUNTER (EMERGENCY)
Age: 44
Discharge: HOME | End: 2024-10-18
Attending: STUDENT IN AN ORGANIZED HEALTH CARE EDUCATION/TRAINING PROGRAM
Payer: COMMERCIAL

## 2024-10-18 VITALS
OXYGEN SATURATION: 99 % | HEART RATE: 74 BPM | WEIGHT: 165 LBS | HEIGHT: 68 IN | BODY MASS INDEX: 25.01 KG/M2 | RESPIRATION RATE: 16 BRPM | SYSTOLIC BLOOD PRESSURE: 110 MMHG | TEMPERATURE: 98.2 F | DIASTOLIC BLOOD PRESSURE: 66 MMHG

## 2024-10-18 DIAGNOSIS — M79.661 RIGHT CALF PAIN: Primary | ICD-10-CM

## 2024-10-18 PROCEDURE — 99282 EMERGENCY DEPT VISIT SF MDM: CPT

## 2024-10-18 ASSESSMENT — ENCOUNTER SYMPTOMS
COUGH: 0
NAUSEA: 0
ABDOMINAL DISTENTION: 0
BACK PAIN: 0
VOMITING: 0
EYE REDNESS: 0
DIARRHEA: 0
SHORTNESS OF BREATH: 0
EYE DISCHARGE: 0
WHEEZING: 0
EYE PAIN: 0
SORE THROAT: 0
SINUS PRESSURE: 0

## 2024-10-18 ASSESSMENT — PAIN DESCRIPTION - PAIN TYPE: TYPE: ACUTE PAIN

## 2024-10-18 ASSESSMENT — PAIN DESCRIPTION - LOCATION: LOCATION: LEG

## 2024-10-18 ASSESSMENT — PAIN DESCRIPTION - FREQUENCY: FREQUENCY: CONTINUOUS

## 2024-10-18 ASSESSMENT — PAIN SCALES - GENERAL: PAINLEVEL_OUTOF10: 5

## 2024-10-18 ASSESSMENT — PAIN DESCRIPTION - DESCRIPTORS: DESCRIPTORS: ACHING;SORE

## 2024-10-18 ASSESSMENT — PAIN DESCRIPTION - ONSET: ONSET: ON-GOING

## 2024-10-18 ASSESSMENT — PAIN DESCRIPTION - ORIENTATION: ORIENTATION: RIGHT;LOWER

## 2024-10-18 ASSESSMENT — PAIN - FUNCTIONAL ASSESSMENT
PAIN_FUNCTIONAL_ASSESSMENT: 0-10
PAIN_FUNCTIONAL_ASSESSMENT: ACTIVITIES ARE NOT PREVENTED

## 2024-10-19 NOTE — ED PROVIDER NOTES
back: Normal range of motion and neck supple.      Comments: No pain with range of motion of the ankle or knee.  The gastroc has a circular area of ecchymosis to the medial aspect.  Compartments are all soft and compressible.  2+ symmetric DP and PT pulses bilaterally.  She is able to ambulate on it.  There is no bony tenderness she does have some varicose veins present.  Achilles tendon intact.   Skin:     General: Skin is warm and dry.      Capillary Refill: Capillary refill takes less than 2 seconds.   Neurological:      General: No focal deficit present.      Mental Status: She is alert.          Procedures     MDM  Patient presents to emergency department after sudden pain in right calf area while bending down.  Clinically not consistent with acute DVT.  Possibly a ruptured varicose vein or possible calf tear or strain.  She is ambulating well on it.  She is in no distress.  She has mild tenderness to palpation with some ecchymosis.  Discussed with patient to continue monitoring it.  If bruising and swelling is worsening if there is severe pain these may be signs of compartment syndrome and she will need to return back for evaluation.  She is agreeable and understanding.  She has no chest pain or shortness of breath.  She is neurovascularly intact.  Vitals all stable and within normal limits.  Discussed symptomatic management and expected course           --------------------------------------------- PAST HISTORY ---------------------------------------------  Past Medical History:  has a past medical history of Asthma, BRCA1 negative, BRCA2 negative, Clotting disorder (HCC), Endometriosis, Gum disease, MTHFR mutation, Osteopenia, Pernicious anemia, Premature ovarian failure, Raynaud's disease, Skin mole, and Tachycardia.    Past Surgical History:  has a past surgical history that includes cyst removal (05/2000); Colonoscopy (2000); Upper gastrointestinal endoscopy (2000); skin biopsy; other surgical history  (3/26/15 excision lesion lt upper arm); laparoscopic appendectomy (N/A, 06/08/2019); Appendectomy; and Ovary removal.    Social History:  reports that she quit smoking about 23 years ago. Her smoking use included cigarettes. She started smoking about 24 years ago. She has a 0.1 pack-year smoking history. She has never used smokeless tobacco. She reports that she does not drink alcohol and does not use drugs.    Family History: family history includes Breast Cancer (age of onset: 45) in her paternal cousin; Breast Cancer (age of onset: 46) in her paternal cousin; Breast Cancer (age of onset: 70) in her paternal aunt; Breast Cancer (age of onset: 75) in her paternal aunt; Colon Cancer in her maternal grandmother, paternal grandfather, and paternal grandmother; Diabetes in her maternal grandfather; Emphysema in her maternal grandfather; Heart Disease in her paternal grandmother; High Cholesterol in her mother; Ulcerative Colitis in her father.     The patient’s home medications have been reviewed.    Allergies: Hydroxyprogesterone, Adhesive tape, Clindamycin/lincomycin, Codeine, and Prednisone    -------------------------------------------------- RESULTS -------------------------------------------------  Labs:  No results found for this visit on 10/18/24.    Radiology:  No orders to display       ------------------------- NURSING NOTES AND VITALS REVIEWED ---------------------------  Date / Time Roomed:  10/18/2024 10:50 PM  ED Bed Assignment:  NAVARRO C/NAVARRO-C    The nursing notes within the ED encounter and vital signs as below have been reviewed.   /66   Pulse 74   Temp 98.2 °F (36.8 °C) (Oral)   Resp 16   Ht 1.727 m (5' 8\")   Wt 74.8 kg (165 lb)   LMP 10/12/2010   SpO2 99%   BMI 25.09 kg/m²   Oxygen Saturation Interpretation: Normal    --------------------------------- ADDITIONAL PROVIDER NOTES ---------------------------------  At this time the patient is without objective evidence of an acute process

## 2024-10-31 ENCOUNTER — HOSPITAL ENCOUNTER (EMERGENCY)
Age: 44
Discharge: HOME | End: 2024-10-31
Attending: EMERGENCY MEDICINE
Payer: COMMERCIAL

## 2024-10-31 ENCOUNTER — APPOINTMENT (OUTPATIENT)
Dept: ULTRASOUND IMAGING | Age: 44
End: 2024-10-31
Payer: COMMERCIAL

## 2024-10-31 VITALS
BODY MASS INDEX: 24.25 KG/M2 | DIASTOLIC BLOOD PRESSURE: 72 MMHG | SYSTOLIC BLOOD PRESSURE: 107 MMHG | HEART RATE: 92 BPM | RESPIRATION RATE: 16 BRPM | WEIGHT: 160 LBS | HEIGHT: 68 IN | OXYGEN SATURATION: 99 % | TEMPERATURE: 98.7 F

## 2024-10-31 DIAGNOSIS — S80.11XA CONTUSION OF RIGHT LOWER EXTREMITY, INITIAL ENCOUNTER: Primary | ICD-10-CM

## 2024-10-31 PROCEDURE — 93971 EXTREMITY STUDY: CPT

## 2024-10-31 PROCEDURE — 99284 EMERGENCY DEPT VISIT MOD MDM: CPT

## 2024-10-31 ASSESSMENT — PAIN SCALES - GENERAL: PAINLEVEL_OUTOF10: 5

## 2024-10-31 ASSESSMENT — LIFESTYLE VARIABLES
HOW MANY STANDARD DRINKS CONTAINING ALCOHOL DO YOU HAVE ON A TYPICAL DAY: PATIENT DOES NOT DRINK
HOW OFTEN DO YOU HAVE A DRINK CONTAINING ALCOHOL: NEVER

## 2024-10-31 ASSESSMENT — PAIN - FUNCTIONAL ASSESSMENT: PAIN_FUNCTIONAL_ASSESSMENT: 0-10

## 2024-10-31 ASSESSMENT — PAIN DESCRIPTION - LOCATION: LOCATION: LEG

## 2024-10-31 NOTE — ED PROVIDER NOTES
HPI:  10/31/24,   Time: 6:12 AM EDT         Julia Benjamin is a 44 y.o. female presenting to the ED for evaluation of discomfort and some swelling in the right lower leg.  She states that 2-1/2 weeks ago she was bending over and felt a sudden pain on the posterior medial aspect of her right calf.  She had some bruising.  Since that time she has had some discomfort in this area.  She is concerned she has a DVT.  No numbness or weakness.    ROS:   Pertinent positives and negatives are stated within HPI, all other systems reviewed and are negative.  --------------------------------------------- PAST HISTORY ---------------------------------------------  Past Medical History:  has a past medical history of Asthma, BRCA1 negative, BRCA2 negative, Clotting disorder (HCC), Endometriosis, Gum disease, MTHFR mutation, Osteopenia, Pernicious anemia, Premature ovarian failure, Raynaud's disease, Skin mole, and Tachycardia.    Past Surgical History:  has a past surgical history that includes cyst removal (05/2000); Colonoscopy (2000); Upper gastrointestinal endoscopy (2000); skin biopsy; other surgical history (3/26/15 excision lesion lt upper arm); laparoscopic appendectomy (N/A, 06/08/2019); Appendectomy; and Ovary removal.    Social History:  reports that she quit smoking about 23 years ago. Her smoking use included cigarettes. She started smoking about 24 years ago. She has a 0.1 pack-year smoking history. She has never used smokeless tobacco. She reports that she does not drink alcohol and does not use drugs.    Family History: family history includes Breast Cancer (age of onset: 45) in her paternal cousin; Breast Cancer (age of onset: 46) in her paternal cousin; Breast Cancer (age of onset: 70) in her paternal aunt; Breast Cancer (age of onset: 75) in her paternal aunt; Colon Cancer in her maternal grandmother, paternal grandfather, and paternal grandmother; Diabetes in her maternal grandfather; Emphysema in her maternal  worsen. Advised to contact primary care physician to secure follow-up appointment within the next 1-2 days.  Also instructed to return immediately should they develop increased pain, numbness or weakness.        --------------------------------- IMPRESSION AND DISPOSITION ---------------------------------    IMPRESSION  1. Contusion of right lower extremity, initial encounter        DISPOSITION  Disposition: Discharge to home  Patient condition is good        Domenico Toledo DO  10/31/24 0802

## (undated) DEVICE — KIT,ANTI FOG,W/SPONGE & FLUID,SOFT PACK: Brand: MEDLINE

## (undated) DEVICE — TROCAR: Brand: KII FIOS FIRST ENTRY

## (undated) DEVICE — TROCAR: Brand: KII® SLEEVE

## (undated) DEVICE — SOLUTION IV IRRIG POUR BRL 0.9% SODIUM CHL 2F7124

## (undated) DEVICE — TOWEL,OR,DSP,ST,BLUE,STD,6/PK,12PK/CS: Brand: MEDLINE

## (undated) DEVICE — LAPAROSCOPIC TROCAR SLEEVE/SINGLE USE: Brand: KII® SLEEVE

## (undated) DEVICE — STAPLER ENDOSCP 45MM L34CM STD NAT ARTC LIN CUT ECHELON FLX

## (undated) DEVICE — SCISSORS ENDOSCP DIA5MM CRV MPLR CAUT W/ RATCH HNDL

## (undated) DEVICE — GOWN,SIRUS,FABRNF,XL,20/CS: Brand: MEDLINE

## (undated) DEVICE — 4-PORT MANIFOLD: Brand: NEPTUNE 2

## (undated) DEVICE — Z DISCONTINUED NO SUB IDED BAG SPEC RETRV M C240ML MOUTH 7.3MM L17CM SHFT 10MM NYL EZEE

## (undated) DEVICE — NEEDLE CLOSURE OMNICLOSE

## (undated) DEVICE — RELOAD STPL L45MM H15 35MM REG TISS BLU 6 ROW B FRM NAT

## (undated) DEVICE — SYRINGE 20ML LL S/C 50

## (undated) DEVICE — SOLUTION IRRIG 2000ML 0.9% SOD CHL USP UROMATIC PLAS CONT

## (undated) DEVICE — INTENDED FOR TISSUE SEPARATION, AND OTHER PROCEDURES THAT REQUIRE A SHARP SURGICAL BLADE TO PUNCTURE OR CUT.: Brand: BARD-PARKER ® STAINLESS STEEL BLADES

## (undated) DEVICE — STANDARD HYPODERMIC NEEDLE,POLYPROPYLENE HUB: Brand: MONOJECT

## (undated) DEVICE — PUMP SUC IRR TBNG L10FT W/ HNDPC ASSEMB STRYKEFLOW 2

## (undated) DEVICE — TOTAL TRAY, DB, 100% SILI FOLEY, 16FR 10: Brand: MEDLINE

## (undated) DEVICE — INSUFFLATION NEEDLE TO ESTABLISH PNEUMOPERITONEUM.: Brand: INSUFFLATION NEEDLE

## (undated) DEVICE — SKIN AFFIX SURG ADHESIVE 72/CS 0.55ML: Brand: MEDLINE

## (undated) DEVICE — SHEARS ENDOSCP L36CM DIA5MM ULTRASONIC CRV TIP ADAPTIVE

## (undated) DEVICE — PACK PROCEDURE SURG GEN CUST

## (undated) DEVICE — DRAPE,CHEST,FENES,15X10,STERIL: Brand: MEDLINE

## (undated) DEVICE — PATIENT RETURN ELECTRODE, SINGLE-USE, CONTACT QUALITY MONITORING, ADULT, WITH 9FT CORD, FOR PATIENTS WEIGING OVER 33LBS. (15KG): Brand: MEGADYNE

## (undated) DEVICE — INSUFFLATION TUBING SET WITH FILTER, FUNNEL CONNECTOR AND LUER LOCK: Brand: JOSNOE MEDICAL INC

## (undated) DEVICE — CHLORAPREP 26ML ORANGE